# Patient Record
Sex: FEMALE | Race: WHITE | Employment: FULL TIME | URBAN - METROPOLITAN AREA
[De-identification: names, ages, dates, MRNs, and addresses within clinical notes are randomized per-mention and may not be internally consistent; named-entity substitution may affect disease eponyms.]

---

## 2020-09-22 ENCOUNTER — OFFICE VISIT (OUTPATIENT)
Dept: FAMILY MEDICINE CLINIC | Facility: CLINIC | Age: 35
End: 2020-09-22

## 2020-09-22 VITALS
TEMPERATURE: 99 F | WEIGHT: 145 LBS | SYSTOLIC BLOOD PRESSURE: 107 MMHG | HEART RATE: 79 BPM | HEIGHT: 61.5 IN | BODY MASS INDEX: 27.03 KG/M2 | DIASTOLIC BLOOD PRESSURE: 70 MMHG

## 2020-09-22 DIAGNOSIS — Z01.419 ROUTINE GYNECOLOGICAL EXAMINATION: Primary | ICD-10-CM

## 2020-09-22 PROCEDURE — 3008F BODY MASS INDEX DOCD: CPT | Performed by: FAMILY MEDICINE

## 2020-09-22 PROCEDURE — 3078F DIAST BP <80 MM HG: CPT | Performed by: FAMILY MEDICINE

## 2020-09-22 PROCEDURE — 90471 IMMUNIZATION ADMIN: CPT | Performed by: FAMILY MEDICINE

## 2020-09-22 PROCEDURE — 99385 PREV VISIT NEW AGE 18-39: CPT | Performed by: FAMILY MEDICINE

## 2020-09-22 PROCEDURE — 3074F SYST BP LT 130 MM HG: CPT | Performed by: FAMILY MEDICINE

## 2020-09-22 PROCEDURE — 90686 IIV4 VACC NO PRSV 0.5 ML IM: CPT | Performed by: FAMILY MEDICINE

## 2020-09-22 NOTE — PROGRESS NOTES
HPI:   Aimee Mendoza is a 27year old female who presents for a complete physical exam.   Patient's last menstrual period was 08/27/2020. 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  Had LEEP done last year with doc through dept developed, well nourished,in no apparent distress  SKIN: no rashes,no suspicious lesions  HEENT: atraumatic, normocephalic,ears and throat are clear  EYES:PERRLA, EOMI,,conjunctiva are clear  NECK: supple,no adenopathy,no bruits  CHEST: no chest tenderness

## 2020-09-23 LAB — HPV I/H RISK 1 DNA SPEC QL NAA+PROBE: NEGATIVE

## 2020-09-26 ENCOUNTER — LAB ENCOUNTER (OUTPATIENT)
Dept: LAB | Age: 35
End: 2020-09-26
Attending: FAMILY MEDICINE
Payer: COMMERCIAL

## 2020-09-26 DIAGNOSIS — Z01.419 ROUTINE GYNECOLOGICAL EXAMINATION: ICD-10-CM

## 2020-09-26 LAB
ALBUMIN SERPL-MCNC: 3.9 G/DL (ref 3.4–5)
ALBUMIN/GLOB SERPL: 1 {RATIO} (ref 1–2)
ALP LIVER SERPL-CCNC: 68 U/L
ALT SERPL-CCNC: 25 U/L
ANION GAP SERPL CALC-SCNC: 7 MMOL/L (ref 0–18)
AST SERPL-CCNC: 15 U/L (ref 15–37)
BASOPHILS # BLD AUTO: 0.03 X10(3) UL (ref 0–0.2)
BASOPHILS NFR BLD AUTO: 0.4 %
BILIRUB SERPL-MCNC: 0.5 MG/DL (ref 0.1–2)
BUN BLD-MCNC: 7 MG/DL (ref 7–18)
BUN/CREAT SERPL: 9.6 (ref 10–20)
CALCIUM BLD-MCNC: 8.9 MG/DL (ref 8.5–10.1)
CHLORIDE SERPL-SCNC: 109 MMOL/L (ref 98–112)
CHOLEST SMN-MCNC: 168 MG/DL (ref ?–200)
CO2 SERPL-SCNC: 22 MMOL/L (ref 21–32)
CREAT BLD-MCNC: 0.73 MG/DL
DEPRECATED RDW RBC AUTO: 43.4 FL (ref 35.1–46.3)
EOSINOPHIL # BLD AUTO: 0.15 X10(3) UL (ref 0–0.7)
EOSINOPHIL NFR BLD AUTO: 2 %
ERYTHROCYTE [DISTWIDTH] IN BLOOD BY AUTOMATED COUNT: 12.2 % (ref 11–15)
GLOBULIN PLAS-MCNC: 3.8 G/DL (ref 2.8–4.4)
GLUCOSE BLD-MCNC: 92 MG/DL (ref 70–99)
HCT VFR BLD AUTO: 38.3 %
HDLC SERPL-MCNC: 57 MG/DL (ref 40–59)
HGB BLD-MCNC: 13 G/DL
IMM GRANULOCYTES # BLD AUTO: 0.03 X10(3) UL (ref 0–1)
IMM GRANULOCYTES NFR BLD: 0.4 %
LDLC SERPL CALC-MCNC: 92 MG/DL (ref ?–100)
LYMPHOCYTES # BLD AUTO: 2.52 X10(3) UL (ref 1–4)
LYMPHOCYTES NFR BLD AUTO: 33.9 %
M PROTEIN MFR SERPL ELPH: 7.7 G/DL (ref 6.4–8.2)
MCH RBC QN AUTO: 32.6 PG (ref 26–34)
MCHC RBC AUTO-ENTMCNC: 33.9 G/DL (ref 31–37)
MCV RBC AUTO: 96 FL
MONOCYTES # BLD AUTO: 0.37 X10(3) UL (ref 0.1–1)
MONOCYTES NFR BLD AUTO: 5 %
NEUTROPHILS # BLD AUTO: 4.33 X10 (3) UL (ref 1.5–7.7)
NEUTROPHILS # BLD AUTO: 4.33 X10(3) UL (ref 1.5–7.7)
NEUTROPHILS NFR BLD AUTO: 58.3 %
NONHDLC SERPL-MCNC: 111 MG/DL (ref ?–130)
OSMOLALITY SERPL CALC.SUM OF ELEC: 284 MOSM/KG (ref 275–295)
PATIENT FASTING Y/N/NP: YES
PATIENT FASTING Y/N/NP: YES
PLATELET # BLD AUTO: 298 10(3)UL (ref 150–450)
POTASSIUM SERPL-SCNC: 3.7 MMOL/L (ref 3.5–5.1)
RBC # BLD AUTO: 3.99 X10(6)UL
SODIUM SERPL-SCNC: 138 MMOL/L (ref 136–145)
TRIGL SERPL-MCNC: 97 MG/DL (ref 30–149)
TSI SER-ACNC: 1.76 MIU/ML (ref 0.36–3.74)
VIT B12 SERPL-MCNC: >2000 PG/ML (ref 193–986)
VLDLC SERPL CALC-MCNC: 19 MG/DL (ref 0–30)
WBC # BLD AUTO: 7.4 X10(3) UL (ref 4–11)

## 2020-09-26 PROCEDURE — 82607 VITAMIN B-12: CPT

## 2020-09-26 PROCEDURE — 80061 LIPID PANEL: CPT

## 2020-09-26 PROCEDURE — 82306 VITAMIN D 25 HYDROXY: CPT

## 2020-09-26 PROCEDURE — 36415 COLL VENOUS BLD VENIPUNCTURE: CPT

## 2020-09-26 PROCEDURE — 80053 COMPREHEN METABOLIC PANEL: CPT

## 2020-09-26 PROCEDURE — 85025 COMPLETE CBC W/AUTO DIFF WBC: CPT

## 2020-09-26 PROCEDURE — 84443 ASSAY THYROID STIM HORMONE: CPT

## 2020-09-28 LAB — 25(OH)D3 SERPL-MCNC: 14.8 NG/ML (ref 30–100)

## 2020-09-29 NOTE — PROGRESS NOTES
Saeid Day - Your pap was normal and negative for HPV.  Plan to repeat pap in 5 years. - Dr. Irineo Najjar

## 2020-09-30 RX ORDER — CHOLECALCIFEROL (VITAMIN D3) 1250 MCG
1 CAPSULE ORAL WEEKLY
Qty: 12 CAPSULE | Refills: 4 | Status: SHIPPED | OUTPATIENT
Start: 2020-09-30 | End: 2021-08-20

## 2020-09-30 NOTE — PROGRESS NOTES
Anne-Marie - Your vitamin D levels are very low. Please start weekly vitamin D 50,000 - sent to the pharmacy. Your B12 levels are actually quite high so if you are taking supplements, please cut back.  Rest of the labs were normal. - Dr. John Bass

## 2020-10-10 ENCOUNTER — OFFICE VISIT (OUTPATIENT)
Dept: FAMILY MEDICINE CLINIC | Facility: CLINIC | Age: 35
End: 2020-10-10

## 2020-10-10 VITALS
TEMPERATURE: 98 F | DIASTOLIC BLOOD PRESSURE: 69 MMHG | SYSTOLIC BLOOD PRESSURE: 107 MMHG | BODY MASS INDEX: 27.14 KG/M2 | HEART RATE: 74 BPM | HEIGHT: 61.5 IN | WEIGHT: 145.63 LBS

## 2020-10-10 DIAGNOSIS — N92.6 MISSED MENSES: Primary | ICD-10-CM

## 2020-10-10 DIAGNOSIS — Z34.90 PREGNANCY, UNSPECIFIED GESTATIONAL AGE: ICD-10-CM

## 2020-10-10 PROCEDURE — 3008F BODY MASS INDEX DOCD: CPT | Performed by: NURSE PRACTITIONER

## 2020-10-10 PROCEDURE — 3078F DIAST BP <80 MM HG: CPT | Performed by: NURSE PRACTITIONER

## 2020-10-10 PROCEDURE — 3074F SYST BP LT 130 MM HG: CPT | Performed by: NURSE PRACTITIONER

## 2020-10-10 PROCEDURE — 81025 URINE PREGNANCY TEST: CPT | Performed by: NURSE PRACTITIONER

## 2020-10-10 PROCEDURE — 99213 OFFICE O/P EST LOW 20 MIN: CPT | Performed by: NURSE PRACTITIONER

## 2020-10-10 NOTE — PROGRESS NOTES
HPI    Patient presents for missed menses. LMP 8/27/2020. Has taken pregnancy tests at home that were positive. With nausea. Needs a letter to confirm pregnancy for work and would like a letter confirming pregnancy in order to apply for medicaid. week: Not on file        Minutes per session: Not on file      Stress: Not on file    Relationships      Social connections        Talks on phone: Not on file        Gets together: Not on file        Attends Qu[employer] service: Not on file        Active mem normal. Judgment and thought content normal.       Assessment and Plan:  Problem List Items Addressed This Visit     None      Visit Diagnoses     Missed menses    -  Primary    Relevant Orders    URINE PREGNANCY TEST (Completed)    Pregnancy, unspecified

## 2020-10-10 NOTE — PATIENT INSTRUCTIONS
New Pregnancy:    Prenatal vitamins-1 tablet once per day (may take at night if nausea/vomitting are a problem)    Avoid alcohol, smoking, second/ smoke, drugs    Avoid raw meats, undercooked meats, raw seafood, unpasteurized dairy products, soft

## 2020-11-09 ENCOUNTER — OFFICE VISIT (OUTPATIENT)
Dept: OBGYN CLINIC | Facility: CLINIC | Age: 35
End: 2020-11-09
Payer: MEDICAID

## 2020-11-09 VITALS — WEIGHT: 145.63 LBS | DIASTOLIC BLOOD PRESSURE: 72 MMHG | BODY MASS INDEX: 27 KG/M2 | SYSTOLIC BLOOD PRESSURE: 112 MMHG

## 2020-11-09 DIAGNOSIS — N92.6 MISSED MENSES: Primary | ICD-10-CM

## 2020-11-09 PROCEDURE — 3078F DIAST BP <80 MM HG: CPT | Performed by: OBSTETRICS & GYNECOLOGY

## 2020-11-09 PROCEDURE — 3074F SYST BP LT 130 MM HG: CPT | Performed by: OBSTETRICS & GYNECOLOGY

## 2020-11-09 PROCEDURE — 81025 URINE PREGNANCY TEST: CPT | Performed by: OBSTETRICS & GYNECOLOGY

## 2020-11-09 PROCEDURE — 99203 OFFICE O/P NEW LOW 30 MIN: CPT | Performed by: OBSTETRICS & GYNECOLOGY

## 2020-11-09 RX ORDER — VITAMIN A ACETATE, BETA CAROTENE, ASCORBIC ACID, CHOLECALCIFEROL, .ALPHA.-TOCOPHEROL ACETATE, DL-, THIAMINE MONONITRATE, RIBOFLAVIN, NIACINAMIDE, PYRIDOXINE HYDROCHLORIDE, FOLIC ACID, CYANOCOBALAMIN, CALCIUM CARBONATE, FERROUS FUMARATE, ZINC OXIDE, CUPRIC OXIDE 3080; 12; 120; 400; 1; 1.84; 3; 20; 22; 920; 25; 200; 27; 10; 2 [IU]/1; UG/1; MG/1; [IU]/1; MG/1; MG/1; MG/1; MG/1; MG/1; [IU]/1; MG/1; MG/1; MG/1; MG/1; MG/1
1 TABLET, FILM COATED ORAL DAILY
Qty: 90 TABLET | Refills: 3 | Status: SHIPPED | OUTPATIENT
Start: 2020-11-09 | End: 2020-12-09

## 2020-11-09 NOTE — PROGRESS NOTES
HPI:    Patient ID: Layla Wong is a 27year old female. HPI  Missed menses  Referred- [facility] APRN  23 P8 , LMP 8/27, 10 4/7 wks, [facility] 6/3/21. Taking PN vitamins. Denies cramping or vaginal bleeding. Has two kids ages 16 and 9.   Both uncom

## 2020-11-13 ENCOUNTER — OFFICE VISIT (OUTPATIENT)
Dept: OBGYN CLINIC | Facility: CLINIC | Age: 35
End: 2020-11-13
Payer: MEDICAID

## 2020-11-13 VITALS
BODY MASS INDEX: 27.03 KG/M2 | HEIGHT: 61.5 IN | SYSTOLIC BLOOD PRESSURE: 102 MMHG | DIASTOLIC BLOOD PRESSURE: 64 MMHG | WEIGHT: 145 LBS

## 2020-11-13 DIAGNOSIS — N92.6 MISSED MENSES: Primary | ICD-10-CM

## 2020-11-13 PROCEDURE — 3078F DIAST BP <80 MM HG: CPT | Performed by: OBSTETRICS & GYNECOLOGY

## 2020-11-13 PROCEDURE — 99212 OFFICE O/P EST SF 10 MIN: CPT | Performed by: OBSTETRICS & GYNECOLOGY

## 2020-11-13 PROCEDURE — 3074F SYST BP LT 130 MM HG: CPT | Performed by: OBSTETRICS & GYNECOLOGY

## 2020-11-13 PROCEDURE — 76817 TRANSVAGINAL US OBSTETRIC: CPT | Performed by: OBSTETRICS & GYNECOLOGY

## 2020-11-13 PROCEDURE — 3008F BODY MASS INDEX DOCD: CPT | Performed by: OBSTETRICS & GYNECOLOGY

## 2020-11-13 NOTE — PROGRESS NOTES
HPI:    Patient ID: Ariane Johnson is a 27year old female. HPI  Here for early OB ultrasound  No complaints. Transvaginal ultrasound shows single live intrauterine gestation at 11-3/7 weeks gestational age with normal fetal heart rate. Very active.   Reuben Severino

## 2020-12-14 ENCOUNTER — OFFICE VISIT (OUTPATIENT)
Dept: OBGYN CLINIC | Facility: CLINIC | Age: 35
End: 2020-12-14
Payer: MEDICAID

## 2020-12-14 VITALS — WEIGHT: 150.81 LBS | BODY MASS INDEX: 28 KG/M2

## 2020-12-14 DIAGNOSIS — N92.6 MISSED MENSES: Primary | ICD-10-CM

## 2020-12-14 DIAGNOSIS — Z34.82 ENCOUNTER FOR SUPERVISION OF OTHER NORMAL PREGNANCY IN SECOND TRIMESTER: ICD-10-CM

## 2020-12-14 PROCEDURE — 99213 OFFICE O/P EST LOW 20 MIN: CPT | Performed by: OBSTETRICS & GYNECOLOGY

## 2020-12-14 NOTE — PROGRESS NOTES
HPI:    Patient ID: Emanuel Lyn is a 27year old female. HPI  OB follow up  Did not do nurse ob ed visit. Patient 15 weeks pregnant. Denies vaginal bleeding or uterine cramping. Not nauseous any more.   Requests genetic screening with Quad screen, o

## 2020-12-21 ENCOUNTER — LAB ENCOUNTER (OUTPATIENT)
Dept: LAB | Age: 35
End: 2020-12-21
Attending: OBSTETRICS & GYNECOLOGY
Payer: COMMERCIAL

## 2020-12-21 DIAGNOSIS — Z34.82 ENCOUNTER FOR SUPERVISION OF OTHER NORMAL PREGNANCY IN SECOND TRIMESTER: ICD-10-CM

## 2020-12-21 PROCEDURE — 81511 FTL CGEN ABNOR FOUR ANAL: CPT

## 2020-12-21 PROCEDURE — 36415 COLL VENOUS BLD VENIPUNCTURE: CPT

## 2020-12-22 ENCOUNTER — NURSE ONLY (OUTPATIENT)
Dept: OBGYN CLINIC | Facility: CLINIC | Age: 35
End: 2020-12-22
Payer: MEDICAID

## 2020-12-22 DIAGNOSIS — Z34.82 ENCOUNTER FOR SUPERVISION OF OTHER NORMAL PREGNANCY IN SECOND TRIMESTER: Primary | ICD-10-CM

## 2020-12-22 RX ORDER — PRENATAL VIT/IRON FUM/FOLIC AC 27MG-0.8MG
1 TABLET ORAL DAILY
COMMUNITY

## 2020-12-22 NOTE — PROGRESS NOTES
Had over the phone Rn OB Ed. Missed menses apt with:MARIMAR  LMP: 20    Pre  BMI: 26.6  EPDS score:0     +UPT in office: 10/10/20, 20    US:   Working PORTIA:20  Hx of genetic abnormality in family: None  Hx of varicella:  Yes

## 2021-01-02 ENCOUNTER — LAB ENCOUNTER (OUTPATIENT)
Dept: LAB | Age: 36
End: 2021-01-02
Attending: OBSTETRICS & GYNECOLOGY
Payer: COMMERCIAL

## 2021-01-02 DIAGNOSIS — Z34.82 ENCOUNTER FOR SUPERVISION OF OTHER NORMAL PREGNANCY IN SECOND TRIMESTER: ICD-10-CM

## 2021-01-02 LAB
ANTIBODY SCREEN: NEGATIVE
BASOPHILS # BLD AUTO: 0.02 X10(3) UL (ref 0–0.2)
BASOPHILS NFR BLD AUTO: 0.2 %
DEPRECATED RDW RBC AUTO: 43.2 FL (ref 35.1–46.3)
EOSINOPHIL # BLD AUTO: 0.1 X10(3) UL (ref 0–0.7)
EOSINOPHIL NFR BLD AUTO: 1.2 %
ERYTHROCYTE [DISTWIDTH] IN BLOOD BY AUTOMATED COUNT: 12.4 % (ref 11–15)
HCT VFR BLD AUTO: 35.2 %
HGB BLD-MCNC: 11.6 G/DL
IMM GRANULOCYTES # BLD AUTO: 0.06 X10(3) UL (ref 0–1)
IMM GRANULOCYTES NFR BLD: 0.7 %
LYMPHOCYTES # BLD AUTO: 2.08 X10(3) UL (ref 1–4)
LYMPHOCYTES NFR BLD AUTO: 24.8 %
MCH RBC QN AUTO: 31.6 PG (ref 26–34)
MCHC RBC AUTO-ENTMCNC: 33 G/DL (ref 31–37)
MCV RBC AUTO: 95.9 FL
MONOCYTES # BLD AUTO: 0.37 X10(3) UL (ref 0.1–1)
MONOCYTES NFR BLD AUTO: 4.4 %
NEUTROPHILS # BLD AUTO: 5.76 X10 (3) UL (ref 1.5–7.7)
NEUTROPHILS # BLD AUTO: 5.76 X10(3) UL (ref 1.5–7.7)
NEUTROPHILS NFR BLD AUTO: 68.7 %
PLATELET # BLD AUTO: 241 10(3)UL (ref 150–450)
RBC # BLD AUTO: 3.67 X10(6)UL
RH BLOOD TYPE: POSITIVE
WBC # BLD AUTO: 8.4 X10(3) UL (ref 4–11)

## 2021-01-02 PROCEDURE — 85025 COMPLETE CBC W/AUTO DIFF WBC: CPT

## 2021-01-02 PROCEDURE — 87340 HEPATITIS B SURFACE AG IA: CPT

## 2021-01-02 PROCEDURE — 87389 HIV-1 AG W/HIV-1&-2 AB AG IA: CPT

## 2021-01-02 PROCEDURE — 86900 BLOOD TYPING SEROLOGIC ABO: CPT

## 2021-01-02 PROCEDURE — 86780 TREPONEMA PALLIDUM: CPT

## 2021-01-02 PROCEDURE — 87086 URINE CULTURE/COLONY COUNT: CPT

## 2021-01-02 PROCEDURE — 86762 RUBELLA ANTIBODY: CPT

## 2021-01-02 PROCEDURE — 36415 COLL VENOUS BLD VENIPUNCTURE: CPT

## 2021-01-02 PROCEDURE — 86901 BLOOD TYPING SEROLOGIC RH(D): CPT

## 2021-01-02 PROCEDURE — 86850 RBC ANTIBODY SCREEN: CPT

## 2021-01-03 LAB
HBV SURFACE AG SER-ACNC: <0.1 [IU]/L
HBV SURFACE AG SERPL QL IA: NONREACTIVE
RUBV IGG SER QL: POSITIVE
RUBV IGG SER-ACNC: 226.8 IU/ML (ref 10–?)

## 2021-01-04 LAB — T PALLIDUM AB SER QL: NEGATIVE

## 2021-01-15 ENCOUNTER — INITIAL PRENATAL (OUTPATIENT)
Dept: OBGYN CLINIC | Facility: CLINIC | Age: 36
End: 2021-01-15
Payer: MEDICAID

## 2021-01-15 VITALS
BODY MASS INDEX: 29 KG/M2 | HEART RATE: 87 BPM | SYSTOLIC BLOOD PRESSURE: 102 MMHG | WEIGHT: 154.38 LBS | DIASTOLIC BLOOD PRESSURE: 66 MMHG

## 2021-01-15 DIAGNOSIS — Z34.82 ENCOUNTER FOR SUPERVISION OF OTHER NORMAL PREGNANCY IN SECOND TRIMESTER: Primary | ICD-10-CM

## 2021-01-15 PROBLEM — Z34.90 SUPERVISION OF NORMAL PREGNANCY: Status: ACTIVE | Noted: 2021-01-15

## 2021-01-15 LAB
APPEARANCE: CLEAR
MULTISTIX LOT#: 1044 NUMERIC
PH, URINE: 7.5 (ref 4.5–8)
SPECIFIC GRAVITY: 1 (ref 1–1.03)
URINE-COLOR: YELLOW
UROBILINOGEN,SEMI-QN: 0.2 MG/DL (ref 0–1.9)

## 2021-01-15 PROCEDURE — 0502F SUBSEQUENT PRENATAL CARE: CPT | Performed by: OBSTETRICS & GYNECOLOGY

## 2021-01-15 PROCEDURE — 3074F SYST BP LT 130 MM HG: CPT | Performed by: OBSTETRICS & GYNECOLOGY

## 2021-01-15 PROCEDURE — 3078F DIAST BP <80 MM HG: CPT | Performed by: OBSTETRICS & GYNECOLOGY

## 2021-01-15 PROCEDURE — 81002 URINALYSIS NONAUTO W/O SCOPE: CPT | Performed by: OBSTETRICS & GYNECOLOGY

## 2021-01-15 NOTE — PROGRESS NOTES
No complaints. Good fetal movements. Denies vaginal pressure pain or discharge. Order for 20-week ultrasound and will be scheduling soon. Had a normal quad screen.

## 2021-01-26 ENCOUNTER — HOSPITAL ENCOUNTER (OUTPATIENT)
Dept: ULTRASOUND IMAGING | Age: 36
Discharge: HOME | End: 2021-01-26
Attending: OBSTETRICS & GYNECOLOGY
Payer: COMMERCIAL

## 2021-01-26 DIAGNOSIS — Z34.82 ENCOUNTER FOR SUPERVISION OF OTHER NORMAL PREGNANCY IN SECOND TRIMESTER: ICD-10-CM

## 2021-01-26 NOTE — PROGRESS NOTES
Ultrasound order changed. Patient needs level 2 ultrasound and cervical length. Needs to be done through MFM and not radiology.

## 2021-02-02 ENCOUNTER — TELEPHONE (OUTPATIENT)
Dept: OBGYN CLINIC | Facility: CLINIC | Age: 36
End: 2021-02-02

## 2021-02-02 ENCOUNTER — TELEPHONE (OUTPATIENT)
Dept: PERINATAL CARE | Facility: HOSPITAL | Age: 36
End: 2021-02-02

## 2021-02-02 NOTE — TELEPHONE ENCOUNTER
Patient name and  verified. Informed patient that she needs level 2 ultrasound and cervical length. Patient unable to have ultrasound done through radiology as they cannot do level 2 ultrasounds.  MFM asking why patient needs cervical length since it is

## 2021-02-02 NOTE — TELEPHONE ENCOUNTER
SHELLIM RECEIVED CALL FROM RADIOLOGY PT SCHEDULED THERE WHEN ORDER WAS FOR MFM. OB OFFICE INFORMED TO CONTACT PATIENT AND STRAIGHTEN OUT WHERE SHE SHOULD BE GOING. PT Nepalese SPEAKING.

## 2021-02-03 ENCOUNTER — TELEPHONE (OUTPATIENT)
Dept: OBGYN CLINIC | Facility: CLINIC | Age: 36
End: 2021-02-03

## 2021-02-03 NOTE — TELEPHONE ENCOUNTER
Copied message from [address]   Patient was recommended to do level 2 ultrasound since her first visit December 14.  As she is now 22-1/2 weeks and has not done anything but a first trimester ultrasound, [facility]

## 2021-02-03 NOTE — TELEPHONE ENCOUNTER
Informed of AJb advise. Order in system. Number given for pt to schedule Level I through radiology. Pt has apt for Level II with [facility] on 02/22. All questions answered.

## 2021-02-03 NOTE — TELEPHONE ENCOUNTER
Per pt she tried calling central scheduling today and wasn't able to set up an apt for 2nd 3rd trimester ultrasound. Called scheduling dept and scheduled pt on 2/9 at 3:00 in [address] [place] [address] 402. To drink 32 ounce an hour prior.  verbalized understanding no further qu

## 2021-02-09 ENCOUNTER — HOSPITAL ENCOUNTER (OUTPATIENT)
Dept: ULTRASOUND IMAGING | Age: 36
Discharge: HOME | End: 2021-02-09
Attending: OBSTETRICS & GYNECOLOGY
Payer: COMMERCIAL

## 2021-02-09 PROCEDURE — 76805 OB US >/= 14 WKS SNGL FETUS: CPT | Performed by: OBSTETRICS & GYNECOLOGY

## 2021-02-10 ENCOUNTER — OFFICE VISIT (OUTPATIENT)
Dept: FAMILY MEDICINE CLINIC | Facility: CLINIC | Age: 36
End: 2021-02-10

## 2021-02-10 VITALS
DIASTOLIC BLOOD PRESSURE: 68 MMHG | BODY MASS INDEX: 29.45 KG/M2 | HEIGHT: 61.5 IN | SYSTOLIC BLOOD PRESSURE: 110 MMHG | WEIGHT: 158 LBS | HEART RATE: 80 BPM

## 2021-02-10 DIAGNOSIS — H61.21 EXCESSIVE CERUMEN IN EAR CANAL, RIGHT: Primary | ICD-10-CM

## 2021-02-10 PROCEDURE — 3078F DIAST BP <80 MM HG: CPT | Performed by: NURSE PRACTITIONER

## 2021-02-10 PROCEDURE — 99213 OFFICE O/P EST LOW 20 MIN: CPT | Performed by: NURSE PRACTITIONER

## 2021-02-10 PROCEDURE — 3008F BODY MASS INDEX DOCD: CPT | Performed by: NURSE PRACTITIONER

## 2021-02-10 PROCEDURE — 3074F SYST BP LT 130 MM HG: CPT | Performed by: NURSE PRACTITIONER

## 2021-02-10 NOTE — PROGRESS NOTES
HPI  Pt here for right ear discomfort and feeling clogged for the past 3 weeks. Has some itching to ear. Pt is pregnant is 23 weeks pregnant    Review of Systems   Constitutional: Negative for activity change and appetite change.    HENT: Positive for ear file      Stress: Not on file    Relationships      Social connections        Talks on phone: Not on file        Gets together: Not on file        Attends [employer] service: Not on file        Active member of club or organization: Not on file        Atten alert and oriented to person, place, and time.        Assessment and Plan:  Problem List Items Addressed This Visit        ENT    Excessive cerumen in ear canal, right - Primary     Discussed use of debrox 5 gtts at hs  Do not use qtips in ears  Please call

## 2021-02-10 NOTE — ASSESSMENT & PLAN NOTE
Discussed use of debrox 5 gtts at hs  Do not use qtips in ears  Please call if symptoms worsen or are not resolving.

## 2021-02-10 NOTE — PATIENT INSTRUCTIONS
Retención de cera en los oídos     Inner ear structures including ear canal and eardrum. La retención de cera en los oídos se produce por la acumulación de la cera natural del oído, lo cual es muy común.  [place] puede causar síntomas onur la pérdida de la Introducir objetos repetidas veces en el oído también puede provocar la retención de cera en el oído. Por ejemplo, usar hisopos de algodón puede empujar el cerumen más adentro en el oído. Con el tiempo, esto puede causar un bloqueo.  Los [employer], los tapo Los proveedores de atención médica desaconsejan el uso de las valery y de los equipos de limpieza para los oídos. No está probado que estos métodos funcionen y pueden causar problemas.    Prevención de la retención de cerumen  Es posible que no pueda preveni

## 2021-02-11 ENCOUNTER — TELEPHONE (OUTPATIENT)
Dept: OBGYN CLINIC | Facility: CLINIC | Age: 36
End: 2021-02-11

## 2021-02-11 NOTE — TELEPHONE ENCOUNTER
[employer] message sent to pt.      ----- Message from Ro Yen MD sent at 2/11/2021  3:27 PM CST -----  Please inform patient by [address], mail, or call of normal OB ultrasound.

## 2021-02-12 ENCOUNTER — ROUTINE PRENATAL (OUTPATIENT)
Dept: OBGYN CLINIC | Facility: CLINIC | Age: 36
End: 2021-02-12
Payer: MEDICAID

## 2021-02-12 VITALS
WEIGHT: 161 LBS | SYSTOLIC BLOOD PRESSURE: 103 MMHG | BODY MASS INDEX: 30 KG/M2 | HEART RATE: 87 BPM | DIASTOLIC BLOOD PRESSURE: 66 MMHG

## 2021-02-12 DIAGNOSIS — Z34.82 ENCOUNTER FOR SUPERVISION OF OTHER NORMAL PREGNANCY IN SECOND TRIMESTER: Primary | ICD-10-CM

## 2021-02-12 PROBLEM — N92.6 MISSED MENSES: Status: RESOLVED | Noted: 2020-11-09 | Resolved: 2021-02-12

## 2021-02-12 PROCEDURE — 81002 URINALYSIS NONAUTO W/O SCOPE: CPT | Performed by: OBSTETRICS & GYNECOLOGY

## 2021-02-12 PROCEDURE — 3074F SYST BP LT 130 MM HG: CPT | Performed by: OBSTETRICS & GYNECOLOGY

## 2021-02-12 PROCEDURE — 3078F DIAST BP <80 MM HG: CPT | Performed by: OBSTETRICS & GYNECOLOGY

## 2021-02-12 PROCEDURE — 0502F SUBSEQUENT PRENATAL CARE: CPT | Performed by: OBSTETRICS & GYNECOLOGY

## 2021-02-20 PROBLEM — O09.522 AMA (ADVANCED MATERNAL AGE) MULTIGRAVIDA 35+, SECOND TRIMESTER: Status: ACTIVE | Noted: 2021-02-20

## 2021-02-21 NOTE — PROGRESS NOTES
Outpatient Maternal-Fetal Medicine Consultation    Dear Dr. Brayden Ross    Thank you for requesting ultrasound evaluation and maternal fetal medicine consultation on your patient Pastor Henriquez.   As you are aware she is a 38year old female [id number] with alert and oriented,no acute distress  Abdomen: gravid, soft, non-tender  Extremities: non-tender, no edema    OBSTETRIC ULTRASOUND  The patient had a level II ultrasound today which revealed size consistent with dates and a normal detailed anatomic survey. also discussed the increased risk of chromosomal abnormalities associated with advanced maternal age. I reviewed that an ultrasound examination cannot reliably exclude potential genetic abnormalities.  Given that the patient will be 38years old at the rosalba summarized above.

## 2021-02-22 ENCOUNTER — HOSPITAL ENCOUNTER (OUTPATIENT)
Dept: PERINATAL CARE | Facility: HOSPITAL | Age: 36
Discharge: HOME | End: 2021-02-22
Attending: OBSTETRICS & GYNECOLOGY
Payer: COMMERCIAL

## 2021-02-22 VITALS
DIASTOLIC BLOOD PRESSURE: 59 MMHG | BODY MASS INDEX: 30 KG/M2 | HEART RATE: 91 BPM | SYSTOLIC BLOOD PRESSURE: 106 MMHG | WEIGHT: 161 LBS

## 2021-02-22 DIAGNOSIS — O09.522 AMA (ADVANCED MATERNAL AGE) MULTIGRAVIDA 35+, SECOND TRIMESTER: ICD-10-CM

## 2021-02-22 DIAGNOSIS — O09.522 AMA (ADVANCED MATERNAL AGE) MULTIGRAVIDA 35+, SECOND TRIMESTER: Primary | ICD-10-CM

## 2021-02-22 PROCEDURE — 99243 OFF/OP CNSLTJ NEW/EST LOW 30: CPT | Performed by: OBSTETRICS & GYNECOLOGY

## 2021-02-22 PROCEDURE — 76811 OB US DETAILED SNGL FETUS: CPT | Performed by: OBSTETRICS & GYNECOLOGY

## 2021-03-06 ENCOUNTER — LAB ENCOUNTER (OUTPATIENT)
Dept: LAB | Age: 36
End: 2021-03-06
Attending: OBSTETRICS & GYNECOLOGY
Payer: COMMERCIAL

## 2021-03-06 DIAGNOSIS — Z34.82 ENCOUNTER FOR SUPERVISION OF OTHER NORMAL PREGNANCY IN SECOND TRIMESTER: ICD-10-CM

## 2021-03-06 LAB
BASOPHILS # BLD AUTO: 0.02 X10(3) UL (ref 0–0.2)
BASOPHILS NFR BLD AUTO: 0.3 %
DEPRECATED RDW RBC AUTO: 47 FL (ref 35.1–46.3)
EOSINOPHIL # BLD AUTO: 0.12 X10(3) UL (ref 0–0.7)
EOSINOPHIL NFR BLD AUTO: 1.5 %
ERYTHROCYTE [DISTWIDTH] IN BLOOD BY AUTOMATED COUNT: 12.9 % (ref 11–15)
GLUCOSE 1H P GLC SERPL-MCNC: 141 MG/DL
HCT VFR BLD AUTO: 34.8 %
HGB BLD-MCNC: 11.3 G/DL
IMM GRANULOCYTES # BLD AUTO: 0.13 X10(3) UL (ref 0–1)
IMM GRANULOCYTES NFR BLD: 1.7 %
LYMPHOCYTES # BLD AUTO: 1.58 X10(3) UL (ref 1–4)
LYMPHOCYTES NFR BLD AUTO: 20.2 %
MCH RBC QN AUTO: 32.3 PG (ref 26–34)
MCHC RBC AUTO-ENTMCNC: 32.5 G/DL (ref 31–37)
MCV RBC AUTO: 99.4 FL
MONOCYTES # BLD AUTO: 0.42 X10(3) UL (ref 0.1–1)
MONOCYTES NFR BLD AUTO: 5.4 %
NEUTROPHILS # BLD AUTO: 5.56 X10 (3) UL (ref 1.5–7.7)
NEUTROPHILS # BLD AUTO: 5.56 X10(3) UL (ref 1.5–7.7)
NEUTROPHILS NFR BLD AUTO: 70.9 %
PLATELET # BLD AUTO: 235 10(3)UL (ref 150–450)
RBC # BLD AUTO: 3.5 X10(6)UL
WBC # BLD AUTO: 7.8 X10(3) UL (ref 4–11)

## 2021-03-06 PROCEDURE — 82950 GLUCOSE TEST: CPT

## 2021-03-06 PROCEDURE — 36415 COLL VENOUS BLD VENIPUNCTURE: CPT

## 2021-03-06 PROCEDURE — 85025 COMPLETE CBC W/AUTO DIFF WBC: CPT

## 2021-03-08 ENCOUNTER — TELEPHONE (OUTPATIENT)
Dept: OBGYN CLINIC | Facility: CLINIC | Age: 36
End: 2021-03-08

## 2021-03-08 DIAGNOSIS — R73.09 ELEVATED GLUCOSE TOLERANCE TEST: Primary | ICD-10-CM

## 2021-03-08 NOTE — TELEPHONE ENCOUNTER
Armenian phone line  #[phone] used to translate phone call. LMTCB    ----- Message from Nataliya Michelle MD sent at 3/8/2021  9:07 AM CST -----  Please inform of elevated Glucola; needs 3 hour GTT.

## 2021-03-09 ENCOUNTER — ROUTINE PRENATAL (OUTPATIENT)
Dept: OBGYN CLINIC | Facility: CLINIC | Age: 36
End: 2021-03-09
Payer: MEDICAID

## 2021-03-09 VITALS — SYSTOLIC BLOOD PRESSURE: 106 MMHG | DIASTOLIC BLOOD PRESSURE: 69 MMHG | BODY MASS INDEX: 30 KG/M2 | WEIGHT: 164 LBS

## 2021-03-09 DIAGNOSIS — O09.529 ANTEPARTUM MULTIGRAVIDA OF ADVANCED MATERNAL AGE: Primary | ICD-10-CM

## 2021-03-09 DIAGNOSIS — Z34.92 NORMAL PREGNANCY IN SECOND TRIMESTER: ICD-10-CM

## 2021-03-09 LAB
APPEARANCE: CLEAR
MULTISTIX LOT#: 1044 NUMERIC
PH, URINE: 6 (ref 4.5–8)
SPECIFIC GRAVITY: 1.02 (ref 1–1.03)
URINE-COLOR: YELLOW
UROBILINOGEN,SEMI-QN: 0.2 MG/DL (ref 0–1.9)

## 2021-03-09 PROCEDURE — 3078F DIAST BP <80 MM HG: CPT | Performed by: OBSTETRICS & GYNECOLOGY

## 2021-03-09 PROCEDURE — 3074F SYST BP LT 130 MM HG: CPT | Performed by: OBSTETRICS & GYNECOLOGY

## 2021-03-09 PROCEDURE — 81002 URINALYSIS NONAUTO W/O SCOPE: CPT | Performed by: OBSTETRICS & GYNECOLOGY

## 2021-03-09 PROCEDURE — 0502F SUBSEQUENT PRENATAL CARE: CPT | Performed by: OBSTETRICS & GYNECOLOGY

## 2021-03-09 NOTE — PROGRESS NOTES
[facility] [place]  Obstetrics and Gynecology  Prenatal Visit  Ever Ordoñez MD    DONNY Medina is a 38year old.o. [id number] 27w5d weeks. Here for routine prenatal visit and is without complaints.   Patient denies any regular uterine contrac

## 2021-03-13 ENCOUNTER — LAB ENCOUNTER (OUTPATIENT)
Dept: LAB | Age: 36
End: 2021-03-13
Attending: OBSTETRICS & GYNECOLOGY
Payer: COMMERCIAL

## 2021-03-13 DIAGNOSIS — R73.09 ELEVATED GLUCOSE TOLERANCE TEST: ICD-10-CM

## 2021-03-13 LAB
1 HR GLUCOSE GESTATIONAL: 146 MG/DL
GLUCOSE 1H P GLC SERPL-MCNC: 117 MG/DL
GLUCOSE 3H P GLC SERPL-MCNC: 111 MG/DL
GLUCOSE P FAST SERPL-MCNC: 89 MG/DL

## 2021-03-13 PROCEDURE — 36415 COLL VENOUS BLD VENIPUNCTURE: CPT

## 2021-03-13 PROCEDURE — 82952 GTT-ADDED SAMPLES: CPT

## 2021-03-13 PROCEDURE — 82951 GLUCOSE TOLERANCE TEST (GTT): CPT

## 2021-03-16 ENCOUNTER — TELEPHONE (OUTPATIENT)
Dept: PERINATAL CARE | Facility: HOSPITAL | Age: 36
End: 2021-03-16

## 2021-03-22 ENCOUNTER — TELEPHONE (OUTPATIENT)
Dept: OBGYN CLINIC | Facility: CLINIC | Age: 36
End: 2021-03-22

## 2021-03-22 ENCOUNTER — ROUTINE PRENATAL (OUTPATIENT)
Dept: OBGYN CLINIC | Facility: CLINIC | Age: 36
End: 2021-03-22
Payer: MEDICAID

## 2021-03-22 VITALS — SYSTOLIC BLOOD PRESSURE: 108 MMHG | WEIGHT: 166 LBS | DIASTOLIC BLOOD PRESSURE: 62 MMHG | BODY MASS INDEX: 31 KG/M2

## 2021-03-22 DIAGNOSIS — Z34.83 ENCOUNTER FOR SUPERVISION OF OTHER NORMAL PREGNANCY IN THIRD TRIMESTER: Primary | ICD-10-CM

## 2021-03-22 DIAGNOSIS — O09.529 ANTEPARTUM MULTIGRAVIDA OF ADVANCED MATERNAL AGE: ICD-10-CM

## 2021-03-22 DIAGNOSIS — Z23 NEED FOR VACCINATION: ICD-10-CM

## 2021-03-22 PROBLEM — R87.613 PAPANICOLAOU SMEAR OF CERVIX WITH HIGH GRADE SQUAMOUS INTRAEPITHELIAL LESION (HGSIL): Status: ACTIVE | Noted: 2021-03-22

## 2021-03-22 LAB
APPEARANCE: CLEAR
MULTISTIX LOT#: 5077 NUMERIC
PH, URINE: 6 (ref 4.5–8)
SPECIFIC GRAVITY: 1.01 (ref 1–1.03)
URINE-COLOR: YELLOW
UROBILINOGEN,SEMI-QN: 0.2 MG/DL (ref 0–1.9)

## 2021-03-22 PROCEDURE — 3074F SYST BP LT 130 MM HG: CPT | Performed by: ADVANCED PRACTICE MIDWIFE

## 2021-03-22 PROCEDURE — 3078F DIAST BP <80 MM HG: CPT | Performed by: ADVANCED PRACTICE MIDWIFE

## 2021-03-22 PROCEDURE — 90715 TDAP VACCINE 7 YRS/> IM: CPT | Performed by: ADVANCED PRACTICE MIDWIFE

## 2021-03-22 PROCEDURE — 0502F SUBSEQUENT PRENATAL CARE: CPT | Performed by: ADVANCED PRACTICE MIDWIFE

## 2021-03-22 PROCEDURE — 81003 URINALYSIS AUTO W/O SCOPE: CPT | Performed by: ADVANCED PRACTICE MIDWIFE

## 2021-03-22 PROCEDURE — 90471 IMMUNIZATION ADMIN: CPT | Performed by: ADVANCED PRACTICE MIDWIFE

## 2021-03-22 RX ORDER — BREAST PUMP
EACH MISCELLANEOUS
Qty: 1 EACH | Refills: 0 | Status: SHIPPED | OUTPATIENT
Start: 2021-03-22 | End: 2021-03-22

## 2021-03-22 RX ORDER — BREAST PUMP
EACH MISCELLANEOUS
Qty: 1 EACH | Refills: 0 | Status: SHIPPED | OUTPATIENT
Start: 2021-03-22 | End: 2021-08-20

## 2021-03-22 NOTE — PROGRESS NOTES
[facility]  Obstetrics and Gynecology  Prenatal Visit  Latesha Myers CNM, APRN    HPI   Emanuel Miners Allen Porter is a 38year old.o. [id number] 29w4d weeks.   Baby active, its a girl  Denies HA, vision change, URQ pain, swelling, CTX, or LOF  Denies fev maternal age    Plan     TDAP vaccine counseled, offered, give today  Third trimester labs ordered completed normal abnormal, counseled 35 wk labs and GBS  Blood type O positive Rhogam not indicated  Reviewed [facility]  Third trimester warning signs reviewed  Tr

## 2021-03-22 NOTE — TELEPHONE ENCOUNTER
Per pt she had what she thinks was a LEEP procedure in 2019 and requested the records in January or February in the [address] [place] [address] location to be sent to Dr. Janel De La Paz.    No records seen yet   Pt provided the number to the [facility]  [phone], but o

## 2021-04-06 ENCOUNTER — ROUTINE PRENATAL (OUTPATIENT)
Dept: OBGYN CLINIC | Facility: CLINIC | Age: 36
End: 2021-04-06
Payer: MEDICAID

## 2021-04-06 VITALS — DIASTOLIC BLOOD PRESSURE: 70 MMHG | WEIGHT: 167 LBS | BODY MASS INDEX: 31 KG/M2 | SYSTOLIC BLOOD PRESSURE: 104 MMHG

## 2021-04-06 DIAGNOSIS — Z34.83 ENCOUNTER FOR SUPERVISION OF OTHER NORMAL PREGNANCY IN THIRD TRIMESTER: Primary | ICD-10-CM

## 2021-04-06 PROCEDURE — 3078F DIAST BP <80 MM HG: CPT | Performed by: OBSTETRICS & GYNECOLOGY

## 2021-04-06 PROCEDURE — 81002 URINALYSIS NONAUTO W/O SCOPE: CPT | Performed by: OBSTETRICS & GYNECOLOGY

## 2021-04-06 PROCEDURE — 0502F SUBSEQUENT PRENATAL CARE: CPT | Performed by: OBSTETRICS & GYNECOLOGY

## 2021-04-06 PROCEDURE — 3074F SYST BP LT 130 MM HG: CPT | Performed by: OBSTETRICS & GYNECOLOGY

## 2021-04-12 NOTE — TELEPHONE ENCOUNTER
Called clinic to request pt records, office closed. Will open tomorrow Tues 9-7, Mon. Wednesday, Fri 9-5, and Thursday 9-7.

## 2021-04-13 ENCOUNTER — HOSPITAL ENCOUNTER (OUTPATIENT)
Dept: PERINATAL CARE | Facility: HOSPITAL | Age: 36
Discharge: HOME | End: 2021-04-13
Attending: OBSTETRICS & GYNECOLOGY
Payer: COMMERCIAL

## 2021-04-13 VITALS
SYSTOLIC BLOOD PRESSURE: 96 MMHG | HEART RATE: 81 BPM | WEIGHT: 167 LBS | BODY MASS INDEX: 31 KG/M2 | DIASTOLIC BLOOD PRESSURE: 61 MMHG

## 2021-04-13 DIAGNOSIS — O09.529 ANTEPARTUM MULTIGRAVIDA OF ADVANCED MATERNAL AGE: ICD-10-CM

## 2021-04-13 DIAGNOSIS — O09.529 ANTEPARTUM MULTIGRAVIDA OF ADVANCED MATERNAL AGE: Primary | ICD-10-CM

## 2021-04-13 PROCEDURE — 76816 OB US FOLLOW-UP PER FETUS: CPT | Performed by: OBSTETRICS & GYNECOLOGY

## 2021-04-13 PROCEDURE — 99213 OFFICE O/P EST LOW 20 MIN: CPT | Performed by: OBSTETRICS & GYNECOLOGY

## 2021-04-13 PROCEDURE — 76819 FETAL BIOPHYS PROFIL W/O NST: CPT | Performed by: OBSTETRICS & GYNECOLOGY

## 2021-04-13 NOTE — PROGRESS NOTES
Outpatient Maternal-Fetal Medicine Consultation    Dear Dr. Radha Chen    Thank you for requesting ultrasound evaluation and maternal fetal medicine consultation on your patient Isidro Sandoval.   As you are aware she is a 38year old female [id number] with Disp: 12 capsule, Rfl: 4  Allergies: No Known Allergies    PHYSICAL EXAMINATION:  BP 96/61   Pulse 81   Wt 167 lb (75.8 kg)   LMP 08/27/2020 (Exact Date)   BMI 31.04 kg/m²   General: alert and oriented,no acute distress  Abdomen: gravid, soft, non-tender

## 2021-04-14 NOTE — TELEPHONE ENCOUNTER
Spoke with MA from clinic and requested records for LEEP procedure. Will be faxing them over to office. Look for fax and provider to provider once received.

## 2021-04-22 ENCOUNTER — ROUTINE PRENATAL (OUTPATIENT)
Dept: OBGYN CLINIC | Facility: CLINIC | Age: 36
End: 2021-04-22
Payer: MEDICAID

## 2021-04-22 VITALS — DIASTOLIC BLOOD PRESSURE: 64 MMHG | WEIGHT: 169 LBS | BODY MASS INDEX: 31 KG/M2 | SYSTOLIC BLOOD PRESSURE: 108 MMHG

## 2021-04-22 DIAGNOSIS — Z34.83 ENCOUNTER FOR SUPERVISION OF OTHER NORMAL PREGNANCY IN THIRD TRIMESTER: Primary | ICD-10-CM

## 2021-04-22 PROCEDURE — 81002 URINALYSIS NONAUTO W/O SCOPE: CPT | Performed by: OBSTETRICS & GYNECOLOGY

## 2021-04-22 PROCEDURE — 3078F DIAST BP <80 MM HG: CPT | Performed by: OBSTETRICS & GYNECOLOGY

## 2021-04-22 PROCEDURE — 0502F SUBSEQUENT PRENATAL CARE: CPT | Performed by: OBSTETRICS & GYNECOLOGY

## 2021-04-22 PROCEDURE — 3074F SYST BP LT 130 MM HG: CPT | Performed by: OBSTETRICS & GYNECOLOGY

## 2021-04-28 NOTE — TELEPHONE ENCOUNTER
Spoke with Barbara Sage from [facility] whom will fax over pt records. Look for records.

## 2021-05-06 PROBLEM — H61.21 EXCESSIVE CERUMEN IN EAR CANAL, RIGHT: Status: RESOLVED | Noted: 2021-02-10 | Resolved: 2021-05-06

## 2021-05-06 NOTE — TELEPHONE ENCOUNTER
Patient name and  verified. FMLA forms also received with fax. Confirmed with patient forms to be completed by ob office. Will send to forms dept for completion.  Please advise

## 2021-05-07 ENCOUNTER — TELEPHONE (OUTPATIENT)
Dept: OBGYN CLINIC | Facility: CLINIC | Age: 36
End: 2021-05-07

## 2021-05-07 ENCOUNTER — ROUTINE PRENATAL (OUTPATIENT)
Dept: OBGYN CLINIC | Facility: CLINIC | Age: 36
End: 2021-05-07
Payer: MEDICAID

## 2021-05-07 VITALS — WEIGHT: 170 LBS | BODY MASS INDEX: 32 KG/M2 | DIASTOLIC BLOOD PRESSURE: 70 MMHG | SYSTOLIC BLOOD PRESSURE: 110 MMHG

## 2021-05-07 DIAGNOSIS — Z34.83 ENCOUNTER FOR SUPERVISION OF OTHER NORMAL PREGNANCY IN THIRD TRIMESTER: Primary | ICD-10-CM

## 2021-05-07 PROCEDURE — 3074F SYST BP LT 130 MM HG: CPT | Performed by: OBSTETRICS & GYNECOLOGY

## 2021-05-07 PROCEDURE — 81002 URINALYSIS NONAUTO W/O SCOPE: CPT | Performed by: OBSTETRICS & GYNECOLOGY

## 2021-05-07 PROCEDURE — 3078F DIAST BP <80 MM HG: CPT | Performed by: OBSTETRICS & GYNECOLOGY

## 2021-05-07 PROCEDURE — 0502F SUBSEQUENT PRENATAL CARE: CPT | Performed by: OBSTETRICS & GYNECOLOGY

## 2021-05-08 ENCOUNTER — LAB ENCOUNTER (OUTPATIENT)
Dept: LAB | Age: 36
End: 2021-05-08
Attending: OBSTETRICS & GYNECOLOGY
Payer: COMMERCIAL

## 2021-05-08 DIAGNOSIS — Z34.83 ENCOUNTER FOR SUPERVISION OF OTHER NORMAL PREGNANCY IN THIRD TRIMESTER: ICD-10-CM

## 2021-05-08 PROCEDURE — 87389 HIV-1 AG W/HIV-1&-2 AB AG IA: CPT

## 2021-05-08 PROCEDURE — 36415 COLL VENOUS BLD VENIPUNCTURE: CPT

## 2021-05-08 PROCEDURE — 86780 TREPONEMA PALLIDUM: CPT

## 2021-05-08 PROCEDURE — 85025 COMPLETE CBC W/AUTO DIFF WBC: CPT

## 2021-05-10 ENCOUNTER — TELEPHONE (OUTPATIENT)
Dept: OBGYN CLINIC | Facility: CLINIC | Age: 36
End: 2021-05-10

## 2021-05-10 DIAGNOSIS — O09.523 MULTIGRAVIDA OF ADVANCED MATERNAL AGE IN THIRD TRIMESTER: Primary | ICD-10-CM

## 2021-05-10 NOTE — TELEPHONE ENCOUNTER
Left message for pt to call back to obtain details in regards to FMLA forms completion and inform that we are awaiting a response to the [employer] message in regards to Monse Varela.

## 2021-05-10 NOTE — TELEPHONE ENCOUNTER
Moroccan phone line  #[phone] used to translate phone call. ----- Message from Alexis Sorto MD sent at 5/9/2021  8:41 PM CDT -----    UNABLE TO [address] [place] [address]. NO VOICEMAIL SET UP.        Please inform patient that GBS vaginal culture was posi

## 2021-05-10 NOTE — TELEPHONE ENCOUNTER
Lmtcb: pt to schedule weekly NSTs due to [facility] per AJB's last office visit note. Pt can call [facility] to schedule: [phone].

## 2021-05-10 NOTE — TELEPHONE ENCOUNTER
Dr. Delcid Blinks     Please sign off on form: FMLA  -Highlight the patient and hit \"Chart\" button.   -In Chart Review, w/in the Encounter tab - click 1 time on the Telephone call encounter for 5/7/2021 Scroll down the telephone encounter.  -Click \"scan on\" devendra

## 2021-05-10 NOTE — TELEPHONE ENCOUNTER
Pt is requesting a call back from a nurse. Pt states at her last PN appt she was told she was going to get called in regards to NST appts.  Pt is Irish speaking no

## 2021-05-12 ENCOUNTER — ROUTINE PRENATAL (OUTPATIENT)
Dept: OBGYN CLINIC | Facility: CLINIC | Age: 36
End: 2021-05-12
Payer: MEDICAID

## 2021-05-12 ENCOUNTER — APPOINTMENT (OUTPATIENT)
Dept: OBGYN CLINIC | Facility: HOSPITAL | Age: 36
End: 2021-05-12
Payer: COMMERCIAL

## 2021-05-12 ENCOUNTER — HOSPITAL ENCOUNTER (OUTPATIENT)
Facility: HOSPITAL | Age: 36
Discharge: HOME | End: 2021-05-12
Attending: OBSTETRICS & GYNECOLOGY | Admitting: OBSTETRICS & GYNECOLOGY
Payer: COMMERCIAL

## 2021-05-12 VITALS — DIASTOLIC BLOOD PRESSURE: 63 MMHG | WEIGHT: 169 LBS | BODY MASS INDEX: 31 KG/M2 | SYSTOLIC BLOOD PRESSURE: 102 MMHG

## 2021-05-12 VITALS
HEART RATE: 79 BPM | SYSTOLIC BLOOD PRESSURE: 103 MMHG | DIASTOLIC BLOOD PRESSURE: 67 MMHG | TEMPERATURE: 98 F | RESPIRATION RATE: 16 BRPM

## 2021-05-12 DIAGNOSIS — Z34.83 ENCOUNTER FOR SUPERVISION OF OTHER NORMAL PREGNANCY IN THIRD TRIMESTER: Primary | ICD-10-CM

## 2021-05-12 PROCEDURE — 3078F DIAST BP <80 MM HG: CPT | Performed by: OBSTETRICS & GYNECOLOGY

## 2021-05-12 PROCEDURE — 0502F SUBSEQUENT PRENATAL CARE: CPT | Performed by: OBSTETRICS & GYNECOLOGY

## 2021-05-12 PROCEDURE — 81002 URINALYSIS NONAUTO W/O SCOPE: CPT | Performed by: OBSTETRICS & GYNECOLOGY

## 2021-05-12 PROCEDURE — 59025 FETAL NON-STRESS TEST: CPT | Performed by: OBSTETRICS & GYNECOLOGY

## 2021-05-12 PROCEDURE — 3074F SYST BP LT 130 MM HG: CPT | Performed by: OBSTETRICS & GYNECOLOGY

## 2021-05-12 NOTE — PROGRESS NOTES
Pt is a 38year old female admitted to TR1/TR1-A. Pt here for weekly NST for AMA  Pt is  36w6d intra-uterine pregnancy. History obtained, consents signed. Oriented to room, staff, and plan of care.

## 2021-05-12 NOTE — PROGRESS NOTES
No C/Os. Had NST today for AMA. For weekly NSTs due to [facility]. Patient informed of + GBBS and will need IV Penicillin while in Labor.

## 2021-05-17 NOTE — TELEPHONE ENCOUNTER
HIPAA logged and scanned into chart. Section 4 just has HR.  Will send [facility].

## 2021-05-17 NOTE — TELEPHONE ENCOUNTER
Left message for pt to call back to inform that the forms department is still awaiting the completion of HIPPA form.

## 2021-05-19 ENCOUNTER — APPOINTMENT (OUTPATIENT)
Dept: OBGYN CLINIC | Facility: HOSPITAL | Age: 36
End: 2021-05-19
Payer: COMMERCIAL

## 2021-05-19 ENCOUNTER — NST DOCUMENTATION (OUTPATIENT)
Dept: OBGYN CLINIC | Facility: CLINIC | Age: 36
End: 2021-05-19

## 2021-05-19 ENCOUNTER — HOSPITAL ENCOUNTER (OUTPATIENT)
Facility: HOSPITAL | Age: 36
Discharge: HOME | End: 2021-05-19
Attending: OBSTETRICS & GYNECOLOGY | Admitting: OBSTETRICS & GYNECOLOGY
Payer: COMMERCIAL

## 2021-05-19 VITALS — DIASTOLIC BLOOD PRESSURE: 65 MMHG | RESPIRATION RATE: 16 BRPM | HEART RATE: 78 BPM | SYSTOLIC BLOOD PRESSURE: 113 MMHG

## 2021-05-19 DIAGNOSIS — O09.529 ANTEPARTUM MULTIGRAVIDA OF ADVANCED MATERNAL AGE: ICD-10-CM

## 2021-05-19 PROCEDURE — 59025 FETAL NON-STRESS TEST: CPT | Performed by: OBSTETRICS & GYNECOLOGY

## 2021-05-19 PROCEDURE — 59025 FETAL NON-STRESS TEST: CPT

## 2021-05-19 NOTE — TELEPHONE ENCOUNTER
Completed FMLA forms faxed to [facility] at [phone], confirmation received. Sent pt a [employer] message.

## 2021-05-20 ENCOUNTER — ROUTINE PRENATAL (OUTPATIENT)
Dept: OBGYN CLINIC | Facility: CLINIC | Age: 36
End: 2021-05-20
Payer: MEDICAID

## 2021-05-20 VITALS — WEIGHT: 169 LBS | DIASTOLIC BLOOD PRESSURE: 68 MMHG | SYSTOLIC BLOOD PRESSURE: 104 MMHG | BODY MASS INDEX: 31 KG/M2

## 2021-05-20 DIAGNOSIS — Z34.83 ENCOUNTER FOR SUPERVISION OF OTHER NORMAL PREGNANCY IN THIRD TRIMESTER: Primary | ICD-10-CM

## 2021-05-20 PROCEDURE — 3074F SYST BP LT 130 MM HG: CPT | Performed by: OBSTETRICS & GYNECOLOGY

## 2021-05-20 PROCEDURE — 81003 URINALYSIS AUTO W/O SCOPE: CPT | Performed by: OBSTETRICS & GYNECOLOGY

## 2021-05-20 PROCEDURE — 3078F DIAST BP <80 MM HG: CPT | Performed by: OBSTETRICS & GYNECOLOGY

## 2021-05-20 PROCEDURE — 0502F SUBSEQUENT PRENATAL CARE: CPT | Performed by: OBSTETRICS & GYNECOLOGY

## 2021-05-20 NOTE — NST
Nonstress Test   Patient: Kvng Montes De Oca    Gestation: 37w6d    Diagnosis from order: Multigravida of advanced maternal age in third trimester  Inpatient order, no diagnosis associated       NST:         NST DOCUMENTATION 5/19/2021   Variability 6

## 2021-05-21 NOTE — TELEPHONE ENCOUNTER
Patient stopped in and filled out HIPPA forms yesterday. She says that she has already sent in the [employer] paperwork from her employer. RABIA received  Payment collected.      Please follow up

## 2021-05-26 ENCOUNTER — HOSPITAL ENCOUNTER (OUTPATIENT)
Facility: HOSPITAL | Age: 36
Discharge: HOME | End: 2021-05-26
Attending: OBSTETRICS & GYNECOLOGY | Admitting: OBSTETRICS & GYNECOLOGY
Payer: COMMERCIAL

## 2021-05-26 ENCOUNTER — APPOINTMENT (OUTPATIENT)
Dept: OBGYN CLINIC | Facility: HOSPITAL | Age: 36
End: 2021-05-26
Payer: COMMERCIAL

## 2021-05-26 ENCOUNTER — NST DOCUMENTATION (OUTPATIENT)
Dept: OBGYN CLINIC | Facility: CLINIC | Age: 36
End: 2021-05-26

## 2021-05-26 VITALS — HEART RATE: 96 BPM | DIASTOLIC BLOOD PRESSURE: 70 MMHG | SYSTOLIC BLOOD PRESSURE: 107 MMHG

## 2021-05-26 DIAGNOSIS — O09.523 AMA (ADVANCED MATERNAL AGE) MULTIGRAVIDA 35+, THIRD TRIMESTER: ICD-10-CM

## 2021-05-26 PROCEDURE — 59025 FETAL NON-STRESS TEST: CPT | Performed by: OBSTETRICS & GYNECOLOGY

## 2021-05-26 PROCEDURE — 59025 FETAL NON-STRESS TEST: CPT

## 2021-05-26 NOTE — PROGRESS NOTES
Pt is a 38year old female admitted to TR2/TR2-A. Patient presents with:  Non-stress Test: AMA     Pt is [id number] 38w6d intra-uterine pregnancy. History obtained, consents signed. Oriented to room, staff, and plan of care.

## 2021-05-27 NOTE — NST
Nonstress Test   Patient: Velta Schaumann    Gestation: 38w6d    Diagnosis from order: Multigravida of advanced maternal age in third trimester       NST: reactive         NST DOCUMENTATION 5/26/2021   Variability 6-25 BPM   Accelerations Yes   Dec

## 2021-05-28 ENCOUNTER — ROUTINE PRENATAL (OUTPATIENT)
Dept: OBGYN CLINIC | Facility: CLINIC | Age: 36
End: 2021-05-28
Payer: MEDICAID

## 2021-05-28 VITALS
BODY MASS INDEX: 32 KG/M2 | DIASTOLIC BLOOD PRESSURE: 71 MMHG | HEART RATE: 75 BPM | SYSTOLIC BLOOD PRESSURE: 115 MMHG | WEIGHT: 171 LBS

## 2021-05-28 DIAGNOSIS — Z34.83 ENCOUNTER FOR SUPERVISION OF OTHER NORMAL PREGNANCY IN THIRD TRIMESTER: Primary | ICD-10-CM

## 2021-05-28 PROCEDURE — 0502F SUBSEQUENT PRENATAL CARE: CPT | Performed by: OBSTETRICS & GYNECOLOGY

## 2021-05-28 PROCEDURE — 81002 URINALYSIS NONAUTO W/O SCOPE: CPT | Performed by: OBSTETRICS & GYNECOLOGY

## 2021-05-28 PROCEDURE — 3078F DIAST BP <80 MM HG: CPT | Performed by: OBSTETRICS & GYNECOLOGY

## 2021-05-28 PROCEDURE — 3074F SYST BP LT 130 MM HG: CPT | Performed by: OBSTETRICS & GYNECOLOGY

## 2021-05-28 NOTE — PROGRESS NOTES
No complaints. Good fetal movements. Cervix fingertip. Labor and rupture membrane precautions. Fetal movement counting. Weekly NSTs.

## 2021-06-01 ENCOUNTER — HOSPITAL ENCOUNTER (INPATIENT)
Facility: HOSPITAL | Age: 36
LOS: 2 days | Discharge: HOME | End: 2021-06-03
Attending: OBSTETRICS & GYNECOLOGY | Admitting: OBSTETRICS & GYNECOLOGY
Payer: COMMERCIAL

## 2021-06-01 ENCOUNTER — TELEPHONE (OUTPATIENT)
Dept: OBGYN CLINIC | Facility: CLINIC | Age: 36
End: 2021-06-01

## 2021-06-01 PROBLEM — Z34.90 PREGNANCY: Status: ACTIVE | Noted: 2021-06-01

## 2021-06-01 PROCEDURE — 59409 OBSTETRICAL CARE: CPT | Performed by: OBSTETRICS & GYNECOLOGY

## 2021-06-01 PROCEDURE — 0KQM0ZZ REPAIR PERINEUM MUSCLE, OPEN APPROACH: ICD-10-PCS | Performed by: OBSTETRICS & GYNECOLOGY

## 2021-06-01 RX ORDER — METHYLERGONOVINE MALEATE 0.2 MG/ML
0.2 INJECTION INTRAVENOUS ONCE
Status: COMPLETED | OUTPATIENT
Start: 2021-06-01 | End: 2021-06-01

## 2021-06-01 RX ORDER — ONDANSETRON 2 MG/ML
4 INJECTION INTRAMUSCULAR; INTRAVENOUS EVERY 6 HOURS PRN
Status: DISCONTINUED | OUTPATIENT
Start: 2021-06-01 | End: 2021-06-01 | Stop reason: HOSPADM

## 2021-06-01 RX ORDER — DOCUSATE SODIUM 100 MG/1
100 CAPSULE, LIQUID FILLED ORAL
Status: DISCONTINUED | OUTPATIENT
Start: 2021-06-01 | End: 2021-06-03

## 2021-06-01 RX ORDER — ACETAMINOPHEN 325 MG/1
650 TABLET ORAL EVERY 6 HOURS PRN
Status: DISCONTINUED | OUTPATIENT
Start: 2021-06-01 | End: 2021-06-03

## 2021-06-01 RX ORDER — DIAPER,BRIEF,INFANT-TODD,DISP
1 EACH MISCELLANEOUS EVERY 6 HOURS PRN
Status: DISCONTINUED | OUTPATIENT
Start: 2021-06-01 | End: 2021-06-03

## 2021-06-01 RX ORDER — BISACODYL 10 MG
10 SUPPOSITORY, RECTAL RECTAL ONCE AS NEEDED
Status: DISCONTINUED | OUTPATIENT
Start: 2021-06-01 | End: 2021-06-03

## 2021-06-01 RX ORDER — IBUPROFEN 600 MG/1
600 TABLET ORAL EVERY 6 HOURS PRN
Status: DISCONTINUED | OUTPATIENT
Start: 2021-06-01 | End: 2021-06-03

## 2021-06-01 RX ORDER — METHYLERGONOVINE MALEATE 0.2 MG/ML
0.2 INJECTION INTRAVENOUS ONCE
Status: DISCONTINUED | OUTPATIENT
Start: 2021-06-01 | End: 2021-06-01

## 2021-06-01 RX ORDER — SIMETHICONE 80 MG
80 TABLET,CHEWABLE ORAL 3 TIMES DAILY PRN
Status: DISCONTINUED | OUTPATIENT
Start: 2021-06-01 | End: 2021-06-03

## 2021-06-01 RX ORDER — ACETAMINOPHEN 500 MG
500 TABLET ORAL EVERY 6 HOURS PRN
Status: DISCONTINUED | OUTPATIENT
Start: 2021-06-01 | End: 2021-06-01

## 2021-06-01 RX ORDER — CHOLECALCIFEROL (VITAMIN D3) 25 MCG
1 TABLET,CHEWABLE ORAL DAILY
Status: DISCONTINUED | OUTPATIENT
Start: 2021-06-01 | End: 2021-06-03

## 2021-06-01 RX ORDER — TERBUTALINE SULFATE 1 MG/ML
0.25 INJECTION, SOLUTION SUBCUTANEOUS AS NEEDED
Status: DISCONTINUED | OUTPATIENT
Start: 2021-06-01 | End: 2021-06-01 | Stop reason: HOSPADM

## 2021-06-01 RX ORDER — LIDOCAINE HYDROCHLORIDE 10 MG/ML
30 INJECTION, SOLUTION EPIDURAL; INFILTRATION; INTRACAUDAL; PERINEURAL ONCE
Status: DISCONTINUED | OUTPATIENT
Start: 2021-06-01 | End: 2021-06-01 | Stop reason: HOSPADM

## 2021-06-01 RX ORDER — AMMONIA INHALANTS 0.04 G/.3ML
0.3 INHALANT RESPIRATORY (INHALATION) AS NEEDED
Status: DISCONTINUED | OUTPATIENT
Start: 2021-06-01 | End: 2021-06-01 | Stop reason: HOSPADM

## 2021-06-01 RX ORDER — DEXTROSE, SODIUM CHLORIDE, SODIUM LACTATE, POTASSIUM CHLORIDE, AND CALCIUM CHLORIDE 5; .6; .31; .03; .02 G/100ML; G/100ML; G/100ML; G/100ML; G/100ML
INJECTION, SOLUTION INTRAVENOUS AS NEEDED
Status: DISCONTINUED | OUTPATIENT
Start: 2021-06-01 | End: 2021-06-01 | Stop reason: HOSPADM

## 2021-06-01 RX ORDER — ONDANSETRON 2 MG/ML
4 INJECTION INTRAMUSCULAR; INTRAVENOUS EVERY 6 HOURS PRN
Status: DISCONTINUED | OUTPATIENT
Start: 2021-06-01 | End: 2021-06-03

## 2021-06-01 RX ORDER — IBUPROFEN 600 MG/1
600 TABLET ORAL EVERY 6 HOURS PRN
Status: DISCONTINUED | OUTPATIENT
Start: 2021-06-01 | End: 2021-06-01 | Stop reason: HOSPADM

## 2021-06-01 RX ORDER — AMMONIA INHALANTS 0.04 G/.3ML
0.3 INHALANT RESPIRATORY (INHALATION) AS NEEDED
Status: DISCONTINUED | OUTPATIENT
Start: 2021-06-01 | End: 2021-06-03

## 2021-06-01 RX ORDER — SODIUM CHLORIDE, SODIUM LACTATE, POTASSIUM CHLORIDE, CALCIUM CHLORIDE 600; 310; 30; 20 MG/100ML; MG/100ML; MG/100ML; MG/100ML
INJECTION, SOLUTION INTRAVENOUS CONTINUOUS
Status: DISCONTINUED | OUTPATIENT
Start: 2021-06-01 | End: 2021-06-01 | Stop reason: HOSPADM

## 2021-06-01 RX ORDER — TRISODIUM CITRATE DIHYDRATE AND CITRIC ACID MONOHYDRATE 500; 334 MG/5ML; MG/5ML
30 SOLUTION ORAL AS NEEDED
Status: DISCONTINUED | OUTPATIENT
Start: 2021-06-01 | End: 2021-06-01 | Stop reason: HOSPADM

## 2021-06-01 RX ORDER — METHYLERGONOVINE MALEATE 0.2 MG/ML
INJECTION INTRAVENOUS
Status: COMPLETED
Start: 2021-06-01 | End: 2021-06-01

## 2021-06-01 NOTE — TELEPHONE ENCOUNTER
OB History     T2    L2    SAB1  TAB0  Ectopic0  Multiple0  Live Births2   39w5d    Patient name and  verified.  Patient calling labor line and believes her water broke this am. Currently still leaking fluid and now feeling contractions but i

## 2021-06-01 NOTE — H&P
[address]  Patient Status:  Inpatient    1985 MRN [id number]   Location [facility] Attending Rosa Paul MD   [facility] Day # 0 PCP Brea Matamoros MD Rfl: , 5/31/2021 at 2100  Cholecalciferol (VITAMIN D3) 1.25 MG (25555 UT) Oral Cap, Take 1 tablet by mouth once a week., Disp: 12 capsule, Rfl: 4, Past Week at Unknown time  Misc.  Devices (BREAST PUMP) Does not apply Misc, DOUBLE ELECTRIC BREAST PUMP EQUIV gestational age of 38w11d with an estimated date of delivery of:  6/3/2021, by Last Menstrual Period    Active phase labor. Obstetrical history significant for GBS colonizer, advanced maternal age.     Admission problem(s):    Pregnancy  Pt presented in act

## 2021-06-01 NOTE — L&D DELIVERY NOTE
[facility] - [facility]    Vaginal Delivery Note    Estefany Hannon Patient Status:  Inpatient    1985 MRN [id number]   Location [address]  Attending Adelia Flores, David Martinez MD   [facility] Day # 0 PCP Nadege Dunham,

## 2021-06-01 NOTE — PROGRESS NOTES
Pt is a 38year old female admitted to LDR3/LDR3-A. Patient presents with:  R/o Rom: leaking since 0930      Pt is [id number] 39w5d intra-uterine pregnancy. History obtained, consents signed. Oriented to room, staff, and plan of care.

## 2021-06-02 NOTE — LACTATION NOTE
This note was copied from a baby's chart.   LACTATION NOTE - INFANT    Evaluation Type  Evaluation Type: Inpatient    Problems & Assessment  Problems Diagnosed or Identified: Latch difficulty  Infant Assessment: Skin color: pink or appropriate for ethnicity normal...

## 2021-06-02 NOTE — LACTATION NOTE
LACTATION NOTE - MOTHER      Evaluation Type: Inpatient    Problems identified  Problems identified: Knowledge deficit;Milk supply not WNL  Milk supply not WNL: Reduced (potential) (declined offer to use HGP when ABM supplements are provided)    Maternal h

## 2021-06-02 NOTE — PROGRESS NOTES
Pt was transferred to [facility] via wheelchair holding baby both in stable condition.  Report given to [facility]

## 2021-06-02 NOTE — PLAN OF CARE
Problem: Patient Centered Care  Goal: Patient preferences are identified and integrated in the patient's plan of care  Description: Interventions:  - What would you like us to know as we care for you?   - Provide timely, complete, and accurate informatio interaction with staff  Outcome: Progressing     Problem: POSTPARTUM  Goal: Long Term Goal:Experiences normal postpartum course  Description: INTERVENTIONS:  - Assess and monitor vital signs and lab values. - Assess fundus and lochia.   - Provide ice/sitz feeding.  - Assess and monitor for signs of nipple pain/trauma. - Instruct and provide assistance with proper latch. - Review techniques for milk expression (breast pumping) and storage of breast milk.  Provide pumping equipment/supplies, instructions and

## 2021-06-02 NOTE — LACTATION NOTE
Erik Darden LACTATION NOTE - MOTHER      Evaluation Type: Inpatient    Problems identified  Problems identified: Knowledge deficit;Milk supply not WNL  Milk supply not WNL: Reduced (potential)    Maternal history  Maternal history: AMA  Other/comment: GBS+    Breastf

## 2021-06-02 NOTE — LACTATION NOTE
LACTATION NOTE - MOTHER      Evaluation Type: Inpatient    Problems identified  Problems identified: Knowledge deficit    Maternal history  Other/comment: none    Breastfeeding goal  Breastfeeding goal: To maintain breast milk feeding per patient goal    M

## 2021-06-02 NOTE — PROGRESS NOTES
PPD 1    Pt doing well. No c/o. Alert and oriented ,nad  abd soft, nontender,fundus firm  Ext nt    Cbc pending this am    A/p PPD 1  Pt doing well. All questions answered.

## 2021-06-03 VITALS
HEART RATE: 73 BPM | DIASTOLIC BLOOD PRESSURE: 62 MMHG | RESPIRATION RATE: 16 BRPM | TEMPERATURE: 98 F | SYSTOLIC BLOOD PRESSURE: 103 MMHG

## 2021-06-03 RX ORDER — IBUPROFEN 600 MG/1
600 TABLET ORAL EVERY 6 HOURS PRN
Qty: 30 TABLET | Refills: 0 | Status: SHIPPED | OUTPATIENT
Start: 2021-06-03 | End: 2021-08-20

## 2021-06-03 NOTE — DISCHARGE SUMMARY
[facility] - [facility]    Discharge Summary    Chloe Mely Patient Status:  Inpatient    1985 MRN [id number]   Location [facility] 3SE Attending Claude Sequin, MD   [facility] Day # 2 PCP Gopi Sullivan MD     Date of Admi information:  [address]  [phone]                         [address]  Ramona Valdez  6/3/2021

## 2021-06-03 NOTE — PLAN OF CARE
Problem: Patient Centered Care  Goal: Patient preferences are identified and integrated in the patient's plan of care  Description: Interventions:  - What would you like us to know as we care for you?   - Provide timely, complete, and accurate informatio breast  Description: INTERVENTIONS:  - Initiate breast feeding within first hour after birth. - Monitor effectiveness of current breast feeding efforts.   - Assess support systems available to mother/family.  - Identify cultural beliefs/practices regardin measures. Outcome: Progressing  Goal: Appropriate maternal -  bonding  Description: INTERVENTIONS:  - Assess caregiver- interactions. - Assess caregiver's emotional status and coping mechanisms.   - Encourage caregiver to participate in newb

## 2021-06-03 NOTE — PLAN OF CARE
Problem: Patient Centered Care  Goal: Patient preferences are identified and integrated in the patient's plan of care  Description: Interventions:  - What would you like us to know as we care for you?   - Provide timely, complete, and accurate informatio Utilize standard precautions and use personal protective equipment as indicated. Ensure aseptic care of all intravenous lines and invasive tubes/drains.  - Obtain immunization and exposure to communicable diseases history.   6/3/2021 1130 by Aditya Davalos, Progressing  Goal: Establishment of adequate milk supply with medication/procedure interruptions  Description: INTERVENTIONS:  - Review techniques for milk expression (breast pumping).    - Provide pumping equipment/supplies, instructions, and assistance un

## 2021-06-03 NOTE — PLAN OF CARE
Problem: Patient Centered Care  Goal: Patient preferences are identified and integrated in the patient's plan of care  Description: Interventions:  - What would you like us to know as we care for you?   - Provide timely, complete, and accurate informatio interaction with staff  Outcome: Completed     Problem: POSTPARTUM  Goal: Long Term Goal:Experiences normal postpartum course  Description: INTERVENTIONS:  - Assess and monitor vital signs and lab values. - Assess fundus and lochia.   - Provide ice/sitz ba feeding.  - Assess and monitor for signs of nipple pain/trauma. - Instruct and provide assistance with proper latch. - Review techniques for milk expression (breast pumping) and storage of breast milk.  Provide pumping equipment/supplies, instructions and

## 2021-06-30 ENCOUNTER — NST DOCUMENTATION (OUTPATIENT)
Dept: OBGYN CLINIC | Facility: CLINIC | Age: 36
End: 2021-06-30

## 2021-07-02 ENCOUNTER — TELEPHONE (OUTPATIENT)
Dept: OBGYN UNIT | Facility: HOSPITAL | Age: 36
End: 2021-07-02

## 2021-07-14 ENCOUNTER — POSTPARTUM (OUTPATIENT)
Dept: OBGYN CLINIC | Facility: CLINIC | Age: 36
End: 2021-07-14
Payer: MEDICAID

## 2021-07-14 VITALS
BODY MASS INDEX: 28 KG/M2 | WEIGHT: 149.81 LBS | DIASTOLIC BLOOD PRESSURE: 67 MMHG | SYSTOLIC BLOOD PRESSURE: 101 MMHG | HEART RATE: 73 BPM

## 2021-07-14 PROCEDURE — 0503F POSTPARTUM CARE VISIT: CPT | Performed by: OBSTETRICS & GYNECOLOGY

## 2021-07-14 PROCEDURE — 3074F SYST BP LT 130 MM HG: CPT | Performed by: OBSTETRICS & GYNECOLOGY

## 2021-07-14 PROCEDURE — 3078F DIAST BP <80 MM HG: CPT | Performed by: OBSTETRICS & GYNECOLOGY

## 2021-07-14 RX ORDER — ACETAMINOPHEN AND CODEINE PHOSPHATE 120; 12 MG/5ML; MG/5ML
0.35 SOLUTION ORAL DAILY
Qty: 3 EACH | Refills: 5 | Status: SHIPPED | OUTPATIENT
Start: 2021-07-14 | End: 2022-07-14

## 2021-07-14 NOTE — PROGRESS NOTES
HPI:   Daryl Mars is a 38year old female who presents for a pp visit.        Wt Readings from Last 6 Encounters:  07/14/21 : 149 lb 12.8 oz (67.9 kg)  05/28/21 : 171 lb (77.6 kg)  05/20/21 : 169 lb (76.7 kg)  05/12/21 : 169 lb (76.7 kg)  05/07 vision  HEENT: denies nasal congestion, sinus pain or ST  LUNGS: denies shortness of breath with exertion  CARDIOVASCULAR: denies chest pain on exertion  GI: denies abdominal pain,denies heartburn  : denies dysuria, vaginal discharge or itching,periods r

## 2021-08-20 ENCOUNTER — OFFICE VISIT (OUTPATIENT)
Dept: FAMILY MEDICINE CLINIC | Facility: CLINIC | Age: 36
End: 2021-08-20
Payer: MEDICAID

## 2021-08-20 VITALS
BODY MASS INDEX: 28 KG/M2 | WEIGHT: 149 LBS | DIASTOLIC BLOOD PRESSURE: 66 MMHG | HEART RATE: 75 BPM | SYSTOLIC BLOOD PRESSURE: 115 MMHG

## 2021-08-20 DIAGNOSIS — L30.9 DERMATITIS: Primary | ICD-10-CM

## 2021-08-20 PROCEDURE — 3074F SYST BP LT 130 MM HG: CPT | Performed by: NURSE PRACTITIONER

## 2021-08-20 PROCEDURE — 99213 OFFICE O/P EST LOW 20 MIN: CPT | Performed by: NURSE PRACTITIONER

## 2021-08-20 PROCEDURE — 3078F DIAST BP <80 MM HG: CPT | Performed by: NURSE PRACTITIONER

## 2021-08-20 RX ORDER — CETIRIZINE HYDROCHLORIDE 10 MG/1
10 TABLET ORAL DAILY
Qty: 90 TABLET | Refills: 0 | Status: SHIPPED | OUTPATIENT
Start: 2021-08-20

## 2021-08-20 NOTE — PATIENT INSTRUCTIONS
Manejo de la dermatitis atópica (eczema)  Para controlar los síntomas y ayudar a reducir la gravedad y la frecuencia de ellos, pruebe los siguientes consejos de cuidado personal:   Cuidados de la piel  · Báñese con un limpiador suave sin perfume (o anum q persisten o empeoran, pregunte a smith proveedor de [employer] otros tratamientos. Decisiones sobre el estilo de elza  · Maneje el estrés en smith elza. · Grover ropa holgada de algodón que no apriete ni roce la piel.   · Evite el uso de prendas de l

## 2021-08-20 NOTE — PROGRESS NOTES
HPI    Patient presents for rash and itching for the past week. Has been using otc creams without relief. Son recently diagnosed with and treated with steroid cream for the same issue. Denies exposure to new soap, detergent or foods.      Review of Syste Weight Concern: Not Asked        Special Diet: Not Asked        Back Care: Not Asked        Exercise: Not Asked        Bike Helmet: Not Asked        Seat Belt: Not Asked        Self-Exams: Not Asked    Social History Narrative      Not on file    Soci sounds: Normal heart sounds. No murmur heard. Pulmonary:      Effort: Pulmonary effort is normal. No respiratory distress. Breath sounds: Normal breath sounds. No stridor. No wheezing, rhonchi or rales. Chest:      Chest wall: No tenderness.    David Randall

## 2021-12-17 ENCOUNTER — IMMUNIZATION (OUTPATIENT)
Dept: FAMILY MEDICINE CLINIC | Facility: CLINIC | Age: 36
End: 2021-12-17
Payer: MEDICAID

## 2021-12-17 DIAGNOSIS — Z23 NEED FOR VACCINATION: Primary | ICD-10-CM

## 2021-12-17 PROCEDURE — 90686 IIV4 VACC NO PRSV 0.5 ML IM: CPT | Performed by: FAMILY MEDICINE

## 2021-12-17 PROCEDURE — 90471 IMMUNIZATION ADMIN: CPT | Performed by: FAMILY MEDICINE

## 2022-07-09 ENCOUNTER — HOSPITAL ENCOUNTER (OUTPATIENT)
Age: 37
Discharge: HOME | End: 2022-07-09
Payer: COMMERCIAL

## 2022-07-09 VITALS
HEIGHT: 60 IN | HEART RATE: 97 BPM | SYSTOLIC BLOOD PRESSURE: 122 MMHG | TEMPERATURE: 98 F | OXYGEN SATURATION: 98 % | BODY MASS INDEX: 27.48 KG/M2 | RESPIRATION RATE: 18 BRPM | WEIGHT: 140 LBS | DIASTOLIC BLOOD PRESSURE: 74 MMHG

## 2022-07-09 DIAGNOSIS — K08.89 PAIN, DENTAL: Primary | ICD-10-CM

## 2022-07-09 DIAGNOSIS — Z32.01 PREGNANCY TEST POSITIVE: ICD-10-CM

## 2022-07-09 LAB — B-HCG UR QL: POSITIVE

## 2022-07-09 RX ORDER — AMOXICILLIN 500 MG/1
500 TABLET, FILM COATED ORAL 3 TIMES DAILY
Qty: 21 TABLET | Refills: 0 | Status: SHIPPED | OUTPATIENT
Start: 2022-07-09 | End: 2022-07-16

## 2022-07-09 NOTE — ED INITIAL ASSESSMENT (HPI)
Pt c/o dental pain x 3 days.  Also states she had a positive pregnancy test at home, wants to repeat the test. Surgeon - Dr Cirilo Rose

## 2022-08-01 ENCOUNTER — OFFICE VISIT (OUTPATIENT)
Dept: OBGYN CLINIC | Facility: CLINIC | Age: 37
End: 2022-08-01
Payer: MEDICAID

## 2022-08-01 VITALS
HEART RATE: 88 BPM | DIASTOLIC BLOOD PRESSURE: 66 MMHG | SYSTOLIC BLOOD PRESSURE: 113 MMHG | WEIGHT: 143 LBS | BODY MASS INDEX: 28 KG/M2

## 2022-08-01 DIAGNOSIS — O36.62X0 EXCESSIVE FETAL GROWTH AFFECTING MANAGEMENT OF PREGNANCY IN SECOND TRIMESTER, SINGLE OR UNSPECIFIED FETUS: ICD-10-CM

## 2022-08-01 DIAGNOSIS — O09.522 MULTIGRAVIDA OF ADVANCED MATERNAL AGE IN SECOND TRIMESTER: Primary | ICD-10-CM

## 2022-08-01 DIAGNOSIS — N92.6 IRREGULAR MENSES: ICD-10-CM

## 2022-08-01 PROBLEM — Z34.90 PREGNANCY: Status: RESOLVED | Noted: 2021-06-01 | Resolved: 2022-08-01

## 2022-08-01 PROBLEM — Z34.90 SUPERVISION OF NORMAL PREGNANCY: Status: RESOLVED | Noted: 2021-01-15 | Resolved: 2022-08-01

## 2022-08-01 PROBLEM — Z01.419 WOMEN'S ANNUAL ROUTINE GYNECOLOGICAL EXAMINATION: Status: ACTIVE | Noted: 2022-08-01

## 2022-08-01 PROCEDURE — 99213 OFFICE O/P EST LOW 20 MIN: CPT | Performed by: OBSTETRICS & GYNECOLOGY

## 2022-08-01 PROCEDURE — 3078F DIAST BP <80 MM HG: CPT | Performed by: OBSTETRICS & GYNECOLOGY

## 2022-08-01 PROCEDURE — 3074F SYST BP LT 130 MM HG: CPT | Performed by: OBSTETRICS & GYNECOLOGY

## 2022-08-08 NOTE — TELEPHONE ENCOUNTER
Routed to [facility] error. [facility]

## 2022-08-09 ENCOUNTER — NURSE ONLY (OUTPATIENT)
Dept: OBGYN CLINIC | Facility: CLINIC | Age: 37
End: 2022-08-09
Payer: COMMERCIAL

## 2022-08-09 DIAGNOSIS — O09.522 MULTIGRAVIDA OF ADVANCED MATERNAL AGE IN SECOND TRIMESTER: Primary | ICD-10-CM

## 2022-08-09 RX ORDER — PNV,CALCIUM 72/IRON/FOLIC ACID 27 MG-1 MG
TABLET ORAL
Qty: 90 TABLET | Refills: 3 | Status: SHIPPED | OUTPATIENT
Start: 2022-08-09

## 2022-08-09 NOTE — PROGRESS NOTES
Pt here today for RN [facility] Education. Missed menses apt with: MARIMAR  LMP: 22    Pre  BMI: 27.93  EPDS score: 0/30  +UPT at home:  Patient unsure   +UPT in office: 22    US: none  Working PORTIA: 2023 by LMP  Hx of genetic abnormality in family: Denies  Hx of varicella:  Had chicken pox   Flu Vaccine: received   Covid Vaccine: not completed      Consent (if needed): n/a      Sterilization/Contraception: Nexplanon     OUD Screening: Pt. Has answered NO 5P questions and has NO  risk factors. Pt. Given What pregnant women need to know handout. Educational material reviewed with patient: Prenatal care, nutrition, weight gain recommendations, travel, exercise, intercourse, pregnancy changes, safe medications, pregnancy and work, fetal movement, labor and  labor, warning signs, food safety, tdap, cord blood, breastfeeding-both, circumcision-no, and Group B strep. Blood transfusion if needed: yes  PN labs: ordered     Optional genetic screening labs were reviewed: Meliton Hernandez, FTS with US, Quad screen MSAFP and CF screening. [facility] [employer] Policy: informed     NOB apt: 22 with BRADFORD.

## 2022-09-21 NOTE — TELEPHONE ENCOUNTER
Panorama screening results obt  Reviewed by DR Smitha Rogers    Results:  low risk  Low Risk for Aneuploidies, triploidy and Monosomy X  Fetal Sex: Female  Fetal Fraction: 13%    LVMW#TCB with questions  My chart message sent  Copy of results sent for scanning into pt record

## 2022-10-31 NOTE — TELEPHONE ENCOUNTER
Order for iron infusion faxed to the [place] [id number] infusion center. LMTCB      Pt placed in follow up book.

## 2022-10-31 NOTE — TELEPHONE ENCOUNTER
----- Message from Josiah Tellez MD sent at 10/31/2022  6:27 AM CDT -----  Please notify patient of persistent low ferritin level. She will need to start IV iron. Please set this up at the infusion center for Venofer 300 mg IV daily x 3 days.     Pt needs 3 hour GTT to be scheduled due to elevated 50 gram.

## 2022-10-31 NOTE — TELEPHONE ENCOUNTER
----- Message from Montse Bocanegra MD sent at 10/31/2022  6:27 AM CDT -----  Please notify patient of persistent low ferritin level. She will need to start IV iron. Please set this up at the infusion center for Venofer 300 mg IV daily x 3 days.     Pt needs 3 hour GTT to be scheduled due to elevated 50 gram.

## 2022-11-09 NOTE — TELEPHONE ENCOUNTER
Pt Name and  verified. Patient informed and verbalized understanding. Pt will complete 3 hour glucose test this weekend.

## 2022-12-29 NOTE — TRIAGE
[facility]      Triage Note    Nonah Nurse Patient Status:  Outpatient    1985 MRN [id number]   Location [address] Attending Diana Courtney MD   [facility] Day # 0 PCP Venita Dart, MD Delia Gilford: [id number]  Estimated Date of Delivery: 23  Gestation: 36w1d    Chief Complaint    Non-stress Test         Allergies:  No Known Allergies    No orders of the defined types were placed in this encounter.       Lab Results   Component Value Date    WBC 6.5 10/29/2022    HGB 12.1 10/29/2022    HCT 37.1 10/29/2022    .0 10/29/2022    CREATSERUM 0.73 2020    BUN 7 2020     2020    K 3.7 2020     2020    CO2 22.0 2020    GLU 92 2020    CA 8.9 2020    ALB 3.9 2020    ALKPHO 68 2020    BILT 0.5 2020    TP 7.7 2020    AST 15 2020    ALT 25 2020    TSH 1.760 2020    B12 >2,000 (H) 2020       Clinitek UA  Lab Results   Component Value Date    GLUUR Negative 2022    SPECGRAVITY 1.015 2022    URINECUL No Growth at 18-24 hrs. 2022       UA  Lab Results   Component Value Date    COLORUR Yellow 2022    CLARITY Clear 2022    SPECGRAVITY 1.015 2022    PROUR Negative 2022    GLUUR Negative 2022    KETUR Negative 2022    BILUR Negative 2022    BLOODURINE Negative 2022    NITRITE Negative 2022    UROBILINOGEN 0.2 2022    LEUUR Negative 2022  1615   BP: 102/63   Pulse: 85       NST  Variability: Moderate           Accelerations: Yes           Decelerations: None            Baseline: 130 BPM           Uterine Irritability: No           Contractions: Not present                                        Acoustic Stimulator: No           Nonstress Test Interpretation: Reactive           Nonstress Test Second Interpretation: Reactive          FHR Category: Category I             Additional Comments       Patient presents with:  Non-stress Test: Non scheduled NST sent from OFFICE to begin NST 2x week. Starting today. Per Dr Sheri Herron at bedside  Dr Jacky Herron discharged pt to home after reviewing the monitor strip. Dr Maxime Pate pt to follow up next prenatal visit and to schedule NST 2x weekly. Pt ambulated off FBC in staBLE CONDITION.     Zack Siddiqui RN  12/29/2022 4:50 PM

## 2022-12-29 NOTE — PROGRESS NOTES
No C/Os. To start weekly NSTs today due to [facility]. GBBS Culture Done. Labor Precautions reviewed.

## 2023-01-05 NOTE — PROGRESS NOTES
No C/Os. Has NST this evening due to [facility]. Labor Precautions reviewed.

## 2023-01-06 NOTE — NST
Nonstress Test   Patient: Matthieu Murillo    Gestation: 37w1d    Diagnosis from order: Multigravida of advanced maternal age in third trimester       NST:         NST DOCUMENTATION 2023   Variability 6-25 BPM   Accelerations Yes   Decelerations None   Baseline 120   Uterine Irritability No   Contractions Irregular   Acoustic Stimulator No   Nonstress Test Interpretation Reactive   Nonstress Test Second Interpretation Reactive   FHR Category Category I   Comments -   NST Completed by TOMMY Ellison RN   Disposition home    Testing Plan Weekly NST   Provider Notified Dr Jae Gomez     Physician Evaluation      NST Interpretation: Reactive  Fetal Surveillance: 120s BPM; Fetal heart variability: moderate  Fetal Heart Rate decelerations: none  Fetal Heart Rate accelerations: yes    Disposition:   Discharged    Comments:  A: 44 y.o.  [id number] with Estimated Date of Delivery: 23 and IUP at 37w1d here for NST due to advanced maternal age. NST is reactive.     Madeleine Vivar MD, MD  2023  11:34 PM

## 2023-01-12 NOTE — PROGRESS NOTES
Pt is a 44year old female admitted to TR1/TR1-A. Patient presents with:  Non-stress Test     Pt is [id number] 38w1d intra-uterine pregnancy. History obtained, consents signed. Oriented to room, staff, and plan of care.

## 2023-01-20 NOTE — NST
Nonstress Test   Patient: Darío Stanford    Gestation: 39w1d    Diagnosis from order: Multigravida of advanced maternal age in third trimester       NST:         NST DOCUMENTATION 2023   Variability 6-25 BPM   Accelerations Yes   Decelerations None   Baseline 125   Uterine Irritability No   Contractions Irregular   Contraction Frequency -   Acoustic Stimulator No   Nonstress Test Interpretation Reactive   Nonstress Test Second Interpretation Reactive   FHR Category -   Comments -   NST Completed by [facility] RN   Disposition HOme    Testing Plan Weekly NST   Provider Notified DR Calle     Physician Evaluation      NST Interpretation: Reactive  Fetal Surveillance: 120's BPM; Fetal heart variability: moderate  Fetal Heart Rate decelerations: none  Fetal Heart Rate accelerations: yes    Disposition:   Discharged    Comments:  A: 44 y.o.  [id number] with Estimated Date of Delivery: 23 and IUP at 39w1d here for NST due to advanced maternal age. NST is reactive.     Polly Mustafa MD, MD  2023  11:50 PM

## 2023-01-24 NOTE — TELEPHONE ENCOUNTER
Pt scheduled for NST with FBC on 1/26 at 10:00am, NST with DODIE on 1/30 at 11:00 after OB visit. Admission for Induction scheduled 1/31 with cervadil. Added to procedure book. Pt verbalized agreement with appointment dates and times. No further questions.

## 2023-01-24 NOTE — TELEPHONE ENCOUNTER
Please schedule patient for NST and DODIE on 1/30/2023 for postdates pregnancy. Her last NST was 1/19/2023, she should also have an NST on 1/25 or 1/26 for advanced maternal age and elevated maternal BMI/maternal obesity. Please schedule patient for induction of labor on Wednesday, 2/1/2023. She should be admitted on Tuesday evening 1/31/2023 for cervical ripening.

## 2023-01-30 NOTE — PROGRESS NOTES
Late entry    This RN received pt report from DINESH Stroud RN and Alisha Youngblood RN. Pt reports she is a 44 yr old  and is 40+5 weeks gestation today. Pt reports she started ctx this morning that are \"9 minutes apart and some feel stronger than other. \" Pt reports +FM and reports \"I had some leaking last night around 11 pm, but I thought I peed myself. \" Pt reports \"some leaking throughout the night and was uncertain if she had ruptured. Pt denies any medical problems during pregnancy and does not desire an epidural and plans on using IV pain meds if needed.

## 2023-01-30 NOTE — L&D DELIVERY NOTE
[facility]    Vaginal Delivery Note    Chad Eng Patient Status:  Inpatient    1985 MRN [id number]   Location [address] [facility] Attending Latoya Barragan MD   [facility] Day # 0 PCP Natalee Dao MD     Delivery     Infant  Date of Delivery: 2023   Time of Delivery: 4:02 PM  Delivery Type: Normal spontaneous vaginal delivery    Infant Sex  Information for the patient's : Denice Nogueira Girl [[id number]]   female    Infant Birthweight  Information for the patient's : Vu Zavala, Neymar [[id number]]   7 lb 11.8 oz (3.51 kg)     Presentation    Position          Apgars:  1 minute: 9               5 minutes: 9                        10 minutes:      Placenta  Date/Time of Delivery: 2023  4:07 PM   Delivery: spontaneous   Placenta to Pathology: no  Cord Gases Submitted: no  Cord Blood Collection: yes  Cord Tissue Collection: no  Cord Complications: none  Sponge and Needle Counts:  Verified yes      Maternal Anesthesia: none   Episiotomy/Laceration Repair  Laceration: none    Delivery Complications  none    Neonatologist Present: no  Delivery Comment:  over intact perineum     Intake/Output   QBL:  See nursing documentation     Tom Underwood MD  2023  4:16 PM    Vu Zavala Girl [[id number]]    Labor Events     labor?: No   steroids?: None  Antibiotics received during labor?: No  Antibiotics (enter # doses in comment): none  Rupture date/time: 2023 2300     Rupture type: SROM  Fluid color: Clear  Induction: None  Augmentation: Oxytocin  Indications for augmentation: Ineffective Contraction Pattern     Labor Event Times    Labor onset date/time: 2023 0200     Spearman Presentation    No data filed     Operative Delivery    No data filed     Shoulder Dystocia    No data filed     Anesthesia     Analgesics:  Analgesics   FENTANYL CITRATE         Spearman Delivery    Head delivery date/time: 2023 16:02:21   Delivery date/time: 23 16:02:31   Delivery type: Normal spontaneous vaginal delivery  Tub Water Temperature: 70    Details:     Delivery location: delivery room     Delivery Providers    Delivering Clinician: Ion Macedo MD   Delivery personnel:  Provider Role    Baby Nurse    Delivery Nurse         Cord    No data filed     Resuscitation    Method: None      Measurements    Weight: 3510 g 7 lb 11.8 oz Length: 50.7 cm   Head circum.: 33.5 cm          Placenta    Date/time: 2023 1607  Removal: Spontaneous  Appearance: Intact  Disposition: Pathology     Apgars    Living status: Living   Apgar Scoring Key:    0 1 2    Skin color Blue or pale Acrocyanotic Completely pink    Heart rate Absent <100 bpm >100 bpm    Reflex irritability No response Grimace Cry or active withdrawal    Muscle tone Limp Some flexion Active motion    Respiratory effort Absent Weak cry; hypoventilation Good, crying              1 Minute:  5 Minute:  10 Minute:  15 Minute:  20 Minute:    Skin color: 1  1       Heart rate: 2  2       Reflex irritablity: 2  2       Muscle tone: 2  2       Respiratory effort: 2  2       Total: 9  9          Apgars assigned by: Ella Conn  [place] disposition: with mother     Skin to Skin    Skin to skin initiated date/time: 2023  Skin to skin with:  Mother     Vaginal Count    Initial count RN: Meghan Krishnan RN  Initial count Tech: Mesha Hives   Sponges   Sharps    Initial counts 10   0    Final counts 10   0    Final count RN: Edwar Poe RN  Final count MD: Ion Macedo MD     Delivery (Maternal)    No data filed

## 2023-01-30 NOTE — PROGRESS NOTES
Pt is a 44year old female [facility] [place] [address]. Patient presents with:  R/o Labor: Contraction every 9-10 min since 0200. +fm. Denies ROM or bleeding. Pt is  40w5d intra-uterine pregnancy. History obtained, consents signed. Oriented to room, staff, and plan of care.

## 2023-01-31 NOTE — PROGRESS NOTES
Received pt in . Pt transferred via [facility] 23 holding baby. VS and assessment WNL. Oriented to room. POC reviewed. Instructed to call for assistance when ready to void. Report received from L&D RN, Ashanti Chao.

## 2023-01-31 NOTE — PROGRESS NOTES
This RN transferred pt from [address] [place] [address] to [facility] in stable condition with infant in arms via AGUSTIN Yarbrough 23.

## 2023-01-31 NOTE — PLAN OF CARE
Problem: Patient Centered Care  Goal: Patient preferences are identified and integrated in the patient's plan of care  Description: Interventions:  - What would you like us to know as we care for you?  - Provide timely, complete, and accurate information to patient/family  - Incorporate patient and family knowledge, values, beliefs, and cultural backgrounds into the planning and delivery of care  - Encourage patient/family to participate in care and decision-making at the level they choose  - Honor patient and family perspectives and choices  Outcome: Progressing     Problem: Patient/Family Goals  Goal: Patient/Family Long Term Goal  Description: Patient's Long Term Goal:     Interventions:  -   - See additional Care Plan goals for specific interventions  Outcome: Progressing  Goal: Patient/Family Short Term Goal  Description: Patient's Short Term Goal:     Interventions:   -   - See additional Care Plan goals for specific interventions  Outcome: Progressing     Problem: POSTPARTUM  Goal: Long Term Goal:Experiences normal postpartum course  Description: INTERVENTIONS:  - Assess and monitor vital signs and lab values. - Assess fundus and lochia. - Provide ice/sitz baths for perineum discomfort. - Monitor healing of incision/episiotomy/laceration, and assess for signs and symptoms of infection and hematoma. - Assess bladder function and monitor for bladder distention.  - Provide/instruct/assist with pericare as needed. - Provide VTE prophylaxis as needed. - Monitor bowel function.  - Encourage ambulation and provide assistance as needed. - Assess and monitor emotional status and provide social service/psych resources as needed. - Utilize standard precautions and use personal protective equipment as indicated. Ensure aseptic care of all intravenous lines and invasive tubes/drains.  - Obtain immunization and exposure to communicable diseases history.   Outcome: Progressing  Goal: Optimize infant feeding at the breast  Description: INTERVENTIONS:  - Initiate breast feeding within first hour after birth. - Monitor effectiveness of current breast feeding efforts. - Assess support systems available to mother/family.  - Identify cultural beliefs/practices regarding lactation, letdown techniques, maternal food preferences. - Assess mother's knowledge and previous experience with breast feeding.  - Provide information as needed about early infant feeding cues (e.g., rooting, lip smacking, sucking fingers/hand) versus late cue of crying.  - Discuss/demonstrate breast feeding aids (e.g., infant sling, nursing footstool/pillows, and breast pumps). - Encourage mother/other family members to express feelings/concerns, and actively listen. - Educate father/SO about benefits of breast feeding and how to manage common lactation challenges. - Recommend avoidance of specific medications or substances incompatible with breast feeding.  - Assess and monitor for signs of nipple pain/trauma. - Instruct and provide assistance with proper latch. - Review techniques for milk expression (breast pumping) and storage of breast milk. Provide pumping equipment/supplies, instructions and assistance, as needed. - Encourage rooming-in and breast feeding on demand.  - Encourage skin-to-skin contact. - Provide LC support as needed. - Assess for and manage engorgement. - Provide breast feeding education handouts and information on community breast feeding support. Outcome: Progressing  Goal: Establishment of adequate milk supply with medication/procedure interruptions  Description: INTERVENTIONS:  - Review techniques for milk expression (breast pumping). - Provide pumping equipment/supplies, instructions, and assistance until it is safe to breastfeed infant. Outcome: Progressing  Goal: Experiences normal breast weaning course  Description: INTERVENTIONS:  - Assess for and manage engorgement. - Instruct on breast care.   - Provide comfort measures. Outcome: Progressing  Goal: Appropriate maternal -  bonding  Description: INTERVENTIONS:  - Assess caregiver- interactions. - Assess caregiver's emotional status and coping mechanisms. - Encourage caregiver to participate in  daily care. - Assess support systems available to mother/family.  - Provide /case management support as needed.   Outcome: Progressing

## 2023-02-01 NOTE — DISCHARGE SUMMARY
[facility]    Discharge Summary    [facility] Patient Status:  Inpatient    1985 MRN [id number]   Location [facility] 3SE Attending Naomy Jordan MD   [facility] Day # 2 PCP Mio Caballero MD     Primary OB Clinician: horace    EDC: Estimated Date of Delivery: 23    Gestational Age: 39w6d    Antepartum complications: anemia    Date of Delivery: 23    Delivered By: horace    Delivery Type: spontaneous vaginal delivery    Tubal Ligation: n/a    Baby: Liveborn      Anesthesia: IV sedation    Intrapartum Complications: None    Laceration: none    Episiotomy: none    Placenta: spontaneous    Feeding Method: breast fed    Discharge Date: 23    Discharge Time: afternoon    Early Discharge:  NO    Alert and oriented, nad  abd soft nontender, fundus firm  Ext nt    Discharge counseling done. Plan:     Address and phone number verified and same. Follow-up appointment with office in 3 weeks. Angus Banks MD  2023  1:25 PM

## 2023-02-01 NOTE — PLAN OF CARE
Problem: Patient Centered Care  Goal: Patient preferences are identified and integrated in the patient's plan of care  Description: Interventions:  - What would you like us to know as we care for you?  - Provide timely, complete, and accurate information to patient/family  - Incorporate patient and family knowledge, values, beliefs, and cultural backgrounds into the planning and delivery of care  - Encourage patient/family to participate in care and decision-making at the level they choose  - Honor patient and family perspectives and choices  Outcome: Progressing     Problem: Patient/Family Goals  Goal: Patient/Family Long Term Goal  Description: Patient's Long Term Goal:     Interventions  -   - See additional Care Plan goals for specific interventions  Outcome: Progressing  Goal: Patient/Family Short Term Goal  Description: Patient's Short Term Goal:     Interventions:   -   - See additional Care Plan goals for specific interventions  Outcome: Progressing     Problem: POSTPARTUM  Goal: Long Term Goal:Experiences normal postpartum course  Description: INTERVENTIONS:  - Assess and monitor vital signs and lab values. - Assess fundus and lochia. - Provide ice/sitz baths for perineum discomfort. - Monitor healing of incision/episiotomy/laceration, and assess for signs and symptoms of infection and hematoma. - Assess bladder function and monitor for bladder distention.  - Provide/instruct/assist with pericare as needed. - Provide VTE prophylaxis as needed. - Monitor bowel function.  - Encourage ambulation and provide assistance as needed. - Assess and monitor emotional status and provide social service/psych resources as needed. - Utilize standard precautions and use personal protective equipment as indicated. Ensure aseptic care of all intravenous lines and invasive tubes/drains.  - Obtain immunization and exposure to communicable diseases history.   Outcome: Progressing  Goal: Optimize infant feeding at the breast  Description: INTERVENTIONS:  - Initiate breast feeding within first hour after birth. - Monitor effectiveness of current breast feeding efforts. - Assess support systems available to mother/family.  - Identify cultural beliefs/practices regarding lactation, letdown techniques, maternal food preferences. - Assess mother's knowledge and previous experience with breast feeding.  - Provide information as needed about early infant feeding cues (e.g., rooting, lip smacking, sucking fingers/hand) versus late cue of crying.  - Discuss/demonstrate breast feeding aids (e.g., infant sling, nursing footstool/pillows, and breast pumps). - Encourage mother/other family members to express feelings/concerns, and actively listen. - Educate father/SO about benefits of breast feeding and how to manage common lactation challenges. - Recommend avoidance of specific medications or substances incompatible with breast feeding.  - Assess and monitor for signs of nipple pain/trauma. - Instruct and provide assistance with proper latch. - Review techniques for milk expression (breast pumping) and storage of breast milk. Provide pumping equipment/supplies, instructions and assistance, as needed. - Encourage rooming-in and breast feeding on demand.  - Encourage skin-to-skin contact. - Provide LC support as needed. - Assess for and manage engorgement. - Provide breast feeding education handouts and information on community breast feeding support. Outcome: Progressing  Goal: Establishment of adequate milk supply with medication/procedure interruptions  Description: INTERVENTIONS:  - Review techniques for milk expression (breast pumping). - Provide pumping equipment/supplies, instructions, and assistance until it is safe to breastfeed infant. Outcome: Progressing  Goal: Experiences normal breast weaning course  Description: INTERVENTIONS:  - Assess for and manage engorgement. - Instruct on breast care.   - Provide comfort measures. Outcome: Progressing  Goal: Appropriate maternal -  bonding  Description: INTERVENTIONS:  - Assess caregiver- interactions. - Assess caregiver's emotional status and coping mechanisms. - Encourage caregiver to participate in  daily care. - Assess support systems available to mother/family.  - Provide /case management support as needed.   Outcome: Progressing

## 2023-02-01 NOTE — PLAN OF CARE
Discharge order received from MD.  Postpartum folder given, discharge medication form reviewed, signed and given to patient. ID Band matched with baby band. Patient has follow up scheduled for 3 weeks with Dr. Tristin Xiong, discussed with patient who verbalized understanding. Mother is interacting appropriately with baby. Verbalized understanding of follow-up instructions. Discharged in stable condition via wheelchair.

## 2023-02-01 NOTE — PLAN OF CARE
Problem: Patient Centered Care  Goal: Patient preferences are identified and integrated in the patient's plan of care  Description: Interventions:  - What would you like us to know as we care for you? Fourth baby, breastfeeding. Infant cluster feeding. Sinhala speaking only,  used with patient during interactions. - Provide timely, complete, and accurate information to patient/family  - Incorporate patient and family knowledge, values, beliefs, and cultural backgrounds into the planning and delivery of care  - Encourage patient/family to participate in care and decision-making at the level they choose  - Honor patient and family perspectives and choices  Outcome: Progressing     Problem: Patient/Family Goals  Goal: Patient/Family Long Term Goal  Description: Patient's Long Term Goal: Healthy postpartum progression    Interventions:  - Lactation support  - Timely assessments  - Pain control per [facility], discussed with patient  - Bleeding control  - Updated per patient and family preferences   - See additional Care Plan goals for specific interventions  Outcome: Progressing  Goal: Patient/Family Short Term Goal  Description: Patient's Short Term Goal: Home with healthy infant    Interventions:   - Discussion of normal vs abnormal signs for her and baby  - Questions answered using   - See additional Care Plan goals for specific interventions  Outcome: Progressing     Problem: POSTPARTUM  Goal: Long Term Goal:Experiences normal postpartum course  Description: INTERVENTIONS:  - Assess and monitor vital signs and lab values. - Assess fundus and lochia. - Provide ice/sitz baths for perineum discomfort. - Monitor healing of incision/episiotomy/laceration, and assess for signs and symptoms of infection and hematoma. - Assess bladder function and monitor for bladder distention.  - Provide/instruct/assist with pericare as needed. - Provide VTE prophylaxis as needed. - Monitor bowel function.   - Encourage ambulation and provide assistance as needed. - Assess and monitor emotional status and provide social service/psych resources as needed. - Utilize standard precautions and use personal protective equipment as indicated. Ensure aseptic care of all intravenous lines and invasive tubes/drains.  - Obtain immunization and exposure to communicable diseases history. Outcome: Progressing  Goal: Optimize infant feeding at the breast  Description: INTERVENTIONS:  - Initiate breast feeding within first hour after birth. - Monitor effectiveness of current breast feeding efforts. - Assess support systems available to mother/family.  - Identify cultural beliefs/practices regarding lactation, letdown techniques, maternal food preferences. - Assess mother's knowledge and previous experience with breast feeding.  - Provide information as needed about early infant feeding cues (e.g., rooting, lip smacking, sucking fingers/hand) versus late cue of crying.  - Discuss/demonstrate breast feeding aids (e.g., infant sling, nursing footstool/pillows, and breast pumps). - Encourage mother/other family members to express feelings/concerns, and actively listen. - Educate father/SO about benefits of breast feeding and how to manage common lactation challenges. - Recommend avoidance of specific medications or substances incompatible with breast feeding.  - Assess and monitor for signs of nipple pain/trauma. - Instruct and provide assistance with proper latch. - Review techniques for milk expression (breast pumping) and storage of breast milk. Provide pumping equipment/supplies, instructions and assistance, as needed. - Encourage rooming-in and breast feeding on demand.  - Encourage skin-to-skin contact. - Provide LC support as needed. - Assess for and manage engorgement. - Provide breast feeding education handouts and information on community breast feeding support.    Outcome: Progressing  Goal: Establishment of adequate milk supply with medication/procedure interruptions  Description: INTERVENTIONS:  - Review techniques for milk expression (breast pumping). - Provide pumping equipment/supplies, instructions, and assistance until it is safe to breastfeed infant. Outcome: Progressing  Goal: Appropriate maternal -  bonding  Description: INTERVENTIONS:  - Assess caregiver- interactions. - Assess caregiver's emotional status and coping mechanisms. - Encourage caregiver to participate in  daily care. - Assess support systems available to mother/family.  - Provide /case management support as needed.   Outcome: Progressing

## 2023-02-11 NOTE — NST
Nonstress Test   Patient: Paradise Leblanc    Gestation: 40w5d    Diagnosis from order: Multigravida of advanced maternal age in third trimester       NST:         NST DOCUMENTATION 2023   Variability 6-25 BPM   Accelerations Yes   Decelerations None   Baseline 125   Uterine Irritability No   Contractions Irregular   Contraction Frequency -   Acoustic Stimulator No   Nonstress Test Interpretation Reactive   Nonstress Test Second Interpretation Reactive   FHR Category -   Comments -   NST Completed by [facility] RN   Disposition Home    Testing Plan Weekly NST   Provider Notified Dr Debi Curtis         I agree with the above evaluation. NST completed.   Elliott Hedrick MD  2023  9:09 AM

## 2023-02-23 NOTE — PROGRESS NOTES
Specialty Comments  Edit  Show all  Report  Postpartum care      HPI: Meño Ambriz is a 44year old  female who presents for a postpartum visit. Delivery Date: 2023    Delivering Physician: Dr. Deven Angel    Type of Delivery:     Gestational Age: 39w6d    Prenatal Complications: none    Delivery Complications: Variable Decels    Episiotomy/Laceration: n/a    Bleeding: none    Incision: n/a    Rhogam: n/a    Rubella Status/Vaccine: immune    Baby's Name: Rosalba Ramirez    Baby's Gender: Girl    [de-identified] Birth Weight: 7lb 11.8oz    Baby's Health: good    Breast or Formula Feeding: breast    Contraception: ocp's    Depression Screen:     Last Pap: 2020/schedule annual 3 months    Concerns:    1. Encounter for postpartum visit       /79   Pulse 73   Wt 155 lb (70.3 kg)   LMP 2022 (Exact Date)   Wt Readings from Last 3 Encounters:  23 : 155 lb (70.3 kg)  23 : 166 lb (75.3 kg)  23 : 166 lb (75.3 kg)    COVID-19 Vaccine(1) Never done  Annual Physical due on 2021  Annual Depression Screening due on 2023  Pap Smear due on 2023  DTaP,Tdap,and Td Vaccines(3 - Td or Tdap) due on 2032  Influenza Vaccine Completed  Pneumococcal Vaccine: Birth to 64yrs Aged Out        Review of Systems   General: Not Present- Anorexia, Fatigue, Fever, Weight Gain > 10lbs. and Weight Loss > 10lbs. Barrie Murrigo HEENT: Not Present- Headache, Visual Disturbances, Vertigo and Bleeding Gums. Breast: Not Present- Breast Mass, Breast Pain, Nipple Discharge and Nipple Pain. Gastrointestinal: Not Present- Abdominal Pain, Change in Bowel Habits, Nausea and Vomiting. Female Genitourinary: Not Present- Dysuria, Flank Pain, Frequency, Hematuria, Incontinence, Pelvic Pain, Urgency, Vaginal Bleeding and Vaginal Discharge. Psychiatric: Not Present- Anxiety, Change in Sleep Pattern and Depression. Endocrine: Not Present- Libido Change.   Hematology: Not Present- Anemia, Easy Bruising, Prolonged Bleeding and Spontaneous Bleeding. All other systems negative       Physical Exam   The physical exam findings are as follows:       General   Mental Status - Alert. General Appearance - Cooperative. Not in acute distress or Sickly. Orientation - Oriented X4. Build & Nutrition - Well nourished. Hydration - Well hydrated. Chest and Lung Exam   Auscultation:   Breath sounds: - Normal.      Breast   Nipples: Discharge - Bilateral - No Colostrum - thick or Colostrum - yellow. Breast - Bilateral - Normal.      Cardiovascular   Auscultation: Rhythm - Regular. Heart Sounds - Normal heart sounds. Abdomen   Inspection: - Inspection Normal.  Palpation/Percussion: Palpation and Percussion of the abdomen reveal - Non Tender and No Palpable abdominal masses. Auscultation: Auscultation of the abdomen reveals - Bowel sounds normal.      Female Genitourinary     External Genitalia   Perineum - Normal. Bartholin's Gland - Bilateral - Normal.  Introitus: Characteristics - Normal. Discharge - None. Labia Majora: Characteristics - Bilateral - Normal.  Labia Minora: Characteristics - Bilateral - Normal.  Urethra: Characteristics - Normal. Discharge - None. [place] Gland - Bilateral - Normal.  Vulva: Characteristics - Normal. Lesions - None. Speculum & Bimanual   Vagina:   Vaginal Wall: - Normal.  Vaginal Lesions - None. Vaginal Mucosa - Normal.  Cervix: Characteristics - No Motion tenderness. Discharge - None. Uterus: Characteristics - Normal. Fundal Height - Just above symphysis pubis. Adnexa: Characteristics - Bilateral - Normal. Masses - No Adnexal Masses. Pelvic Floor Muscles - 0/5. Peripheral Vascular   Lower Extremity: Inspection - Bilateral - Inspection Normal.  Palpation: Edema - Bilateral - No edema. Neurologic   Mental Status: - Normal.    Discussed postpartum care, expectation for menses, contraception, and pregnancy timing.   Discussed with patient activity level postpartum and when she can resume all normal activities. Encourage patient to continue her prenatal vitamin until done breast-feeding. Patient desires condoms at this point for birth control.   Patient scheduled for her routine GYN exam.      1. Encounter for postpartum visit

## 2025-01-04 NOTE — PROGRESS NOTES
Subjective:   Anne-Marie Xavier is a 39 year old female who presents for Ear Pain (Right ear, itchy, can't hear, feels like its blocked, no fever)   Patient is a pleasant 39-year-old female past medical history significant for abnormal Pap smears and Iron deficiency anemia.  Patient presents to office today new to this office and new to this provider to establish care and have ear evaluated.    PMH: None  PSH: ORIF left wrist.   Meds: vitamin D and prenatals     Patient states via Sunitha [id number]. She has issue with right ear. Feels it is blocked. Can't hear as well. This can feel like this in left ear as well. No pain. This started around víctor. Feels different in am. She is pregnant right now. She is 14 weeks. This is her 5th baby. She has lived her for some time. She has not tried anything.           Past Medical History:    Distal radius fracture    Excessive cerumen in ear canal, right    History of abnormal cervical Pap smear    History of pregnancy     x3, missed AB      Past Surgical History:   Procedure Laterality Date    Hand/finger surgery unlisted Left             History/Other:    Chief Complaint Reviewed and Verified  Nursing Notes Reviewed and   Verified  Tobacco Reviewed  Allergies Reviewed  Medications Reviewed    Problem List Reviewed  Medical History Reviewed  Surgical History   Reviewed  OB Status Reviewed  Family History Reviewed  Social History   Reviewed         Tobacco:  She has never smoked tobacco.    Current Outpatient Medications   Medication Sig Dispense Refill    Prenatal Vit-DSS-Fe Fum-FA (PRENATAL 19) 29-1 MG Oral Tab Take 1 tablet by mouth daily. 90 tablet 3    Ferrous Sulfate 325 (65 Fe) MG Oral Tab Take 1 tablet (325 mg total) by mouth daily with breakfast. 30 tablet 3    Cholecalciferol (VITAMIN D) 50 MCG ( UT) Oral Cap Take 1 capsule (2,000 Units total) by mouth daily. 90 capsule 2    loratadine 10 MG Oral Tab Take 1 tablet (10 mg total) by mouth daily. 30  tablet 0    fluticasone propionate 50 MCG/ACT Nasal Suspension 2 sprays by Each Nare route daily for 360 doses. 9.9 mL 0         Review of Systems:  Review of Systems   Constitutional:  Negative for chills and fever.   HENT:  Positive for ear pain, postnasal drip and rhinorrhea. Negative for congestion, sinus pressure and sinus pain.    Respiratory:  Negative for apnea, cough and shortness of breath.    Cardiovascular:  Negative for chest pain.   Gastrointestinal:  Negative for constipation, diarrhea, nausea and vomiting.         Objective:   BP 95/53 (BP Location: Right arm, Patient Position: Sitting, Cuff Size: adult)   Pulse 92   Ht 5' (1.524 m)   Wt 146 lb 12.8 oz (66.6 kg)   LMP 09/25/2024 (Exact Date)   SpO2 99%   Breastfeeding Yes   BMI 28.67 kg/m²  Estimated body mass index is 28.67 kg/m² as calculated from the following:    Height as of this encounter: 5' (1.524 m).    Weight as of this encounter: 146 lb 12.8 oz (66.6 kg).  Physical Exam  Vitals and nursing note reviewed.   Constitutional:       Appearance: Normal appearance.   HENT:      Right Ear: Ear canal and external ear normal.      Left Ear: Tympanic membrane, ear canal and external ear normal.      Ears:      Comments: OME right ear     Nose: Congestion and rhinorrhea present.      Mouth/Throat:      Mouth: Mucous membranes are moist.      Pharynx: Oropharynx is clear.      Comments: +PND  Cardiovascular:      Rate and Rhythm: Normal rate and regular rhythm.      Pulses: Normal pulses.      Heart sounds: Normal heart sounds.   Pulmonary:      Effort: Pulmonary effort is normal. No respiratory distress.      Breath sounds: Normal breath sounds.   Skin:     Capillary Refill: Capillary refill takes less than 2 seconds.   Neurological:      Mental Status: She is alert and oriented to person, place, and time.           Assessment & Plan:   1. Otitis media with effusion, right (Primary)  -     Loratadine; Take 1 tablet (10 mg total) by mouth daily.   Dispense: 30 tablet; Refill: 0  -     Fluticasone Propionate; 2 sprays by Each Nare route daily for 360 doses.  Dispense: 9.9 mL; Refill: 0  2. Nasal congestion  -     Loratadine; Take 1 tablet (10 mg total) by mouth daily.  Dispense: 30 tablet; Refill: 0  -     Fluticasone Propionate; 2 sprays by Each Nare route daily for 360 doses.  Dispense: 9.9 mL; Refill: 0    Patient with exam consistent for rhinitis, congestions and OME right  Pale boggy turbinates, postnasal drip with cobblestoning, and congestion in back of throat.  Swollen Right TM without infection  Patient educated on  rhinitis and congestion of pregnancy  Educated on the use of normal saline rinses to flush out allergens.  Start antihistamine once daily.  Start Flonase once daily bilateral nares.  If symptoms persist will consider amoxicillin Red flags reviewed  Follow-up for physical     Patient aware of plan of care. All questions answered to satisfaction of the patient. Patient instructed to call office or reach out via Pwin[facility]t if any issues arise. For urgent issues and/or reviewed red flags please proceed to the urgent care or ER.  Also, inform the nurse practitioner with any new symptoms or medication side effects.          Return if symptoms worsen or fail to improve, for Annual physical.    MAIN Farah, 1/4/2025, 11:59 AM

## 2025-01-06 NOTE — PROGRESS NOTES
[facility]  Obstetrics and Gynecology  Pregnancy Confirmation  VU [place]     Anne-Marie Xavier is a 39 year old  presenting for pregnancy confirmation. Patient's last menstrual period was 2024 (exact date). Based off LMP patient is about 14 weeks with an edc of 25. Has not been seen at any other clinic during this pregnancy. States cycles were regular prior to last menses.    Patient had a positive pregnancy test on 11/15/2024.  Menarche: 12 years old (2025 10:32 AM)  Period Cycle (Days): 28-30 days (2025 10:32 AM)  Period Duration (Days): 3 days (2025 10:32 AM)  Period Flow: normal (2025 10:32 AM)  Use of Birth Control (if yes, specify type): None (2025 10:32 AM)  Date When Birth Control Last Used: 2024 (2025 10:32 AM)  Hx Prior Abnormal Pap: No (2025 10:32 AM)  Pap Date: 20 (2025 10:32 AM)  Pap Result Notes: normal (2025 10:32 AM)  Follow Up Recommendation: 5 year follow up (2025 10:32 AM)        Latest Ref Rng & Units 2023    11:30 AM 2020     2:52 PM   RECENT PAP RESULTS   INTERPRETATION/RESULT: Negative for intraepithelial lesion or malignancy Negative for intraepithelial lesion or malignancy  Negative for intraepithelial lesion or malignancy    HPV Negative Negative  Negative        Review of Systems   History obtained from the patient  General ROS: No fevers  Breast ROS: No tenderness  Gastrointestinal ROS: nausea, vomiting  Genito-Urinary ROS: no dysuria, trouble voiding, or hematuria  No pelvic cramping or bleeding  OB History     OB History    Para Term  AB Living   6 4 4   1 4   SAB IAB Ectopic Multiple Live Births   1     0 4      # Outcome Date GA Lbr Jai/2nd Weight Sex Type Anes PTL Lv   6 Current            5 Term 23 40w5d 13:50 / 00:12 7 lb 11.8 oz (3.51 kg) F NORMAL SPONT None N EDEN      Complications: Variable decelerations   4 Term 21 39w5d / 16:46 8 lb 7.8 oz (3.85 kg) F  NORMAL SPONT Local N EDEN   3 Term 14 39w0d  8 lb 9 oz (3.884 kg) M Vag-Spont None N EDEN   2 SAB 2006 9w0d          1 Term 03 39w0d  7 lb 9 oz (3.43 kg) F Vag-Spont None N EDEN     Past Medical History     Past Medical History:    Distal radius fracture    Excessive cerumen in ear canal, right    History of abnormal cervical Pap smear    History of pregnancy     x3, missed AB     Past Surgical History     Past Surgical History:   Procedure Laterality Date    Hand/finger surgery unlisted Left     2015     Allergies   Allergies[1]  Medications     Current Outpatient Medications   Medication Sig Dispense Refill    Prenatal Vit-DSS-Fe Fum-FA (PRENATAL 19) 29-1 MG Oral Tab Take 1 tablet by mouth daily. 90 tablet 3    Ferrous Sulfate 325 (65 Fe) MG Oral Tab Take 1 tablet (325 mg total) by mouth daily with breakfast. 30 tablet 3    Cholecalciferol (VITAMIN D) 50 MCG (2000 UT) Oral Cap Take 1 capsule (2,000 Units total) by mouth daily. 90 capsule 2     Family History     Family History   Problem Relation Age of Onset    No Known Problems Father     No Known Problems Mother      Social History     Social History     Socioeconomic History    Marital status: Single     Spouse name: Not on file    Number of children: Not on file    Years of education: Not on file    Highest education level: Not on file   Occupational History    Not on file   Tobacco Use    Smoking status: Never    Smokeless tobacco: Never   Vaping Use    Vaping status: Never Used   Substance and Sexual Activity    Alcohol use: No    Drug use: Never    Sexual activity: Not on file   Other Topics Concern     Service Not Asked    Blood Transfusions Not Asked    Caffeine Concern Yes     Comment: coffee 1 cup    Occupational Exposure Not Asked    Hobby Hazards Not Asked    Sleep Concern Not Asked    Stress Concern Not Asked    Weight Concern Not Asked    Special Diet Not Asked    Back Care Not Asked    Exercise Not Asked    Bike Helmet Not  Asked    Seat Belt Not Asked    Self-Exams Not Asked   Social History Narrative    Not on file     Social Drivers of Health     Financial Resource Strain: Not on file   Food Insecurity: Not on file   Transportation Needs: Not on file   Stress: No Stress Concern Present (2019)    Received from [facility], [facility] of Occupational Health - Occupational Stress Questionnaire     Feeling of Stress : Not at all   Housing Stability: Not on file     Exam   /71 (BP Location: Right arm, Patient Position: Sitting, Cuff Size: adult)   Pulse 91   Wt 146 lb (66.2 kg)   LMP 2024 (Exact Date)   BMI 28.51 kg/m²     GENERAL: well developed, well nourished, in no apparent distress  ABDOMEN: Soft, non distended; non tender, no masses  EXTREMITIES:  non tender without edema  Ultrasound   Limited bedside ultrasound: Live IUP, FHTs 144  Assessment   Anne-Marie is a 39 year old female  with positive pregnancy test in first trimester AMA, 4 previous vaginal deliveries      ICD-10-CM    1. Missed menses  N92.6 POC Urine pregnancy test [51136]     CBC W Differential W Platelet     Rubella, IGG     Antibody Screen     T PALLIDUM SCREENING CASCADE     Hepatitis B Surface Antigen     Blood Type, ABO And Rh D     Urine Culture, Routine     HIV AG AB Combo     HCV Antibody      2. Multigravida of advanced maternal age in first trimester (HCC)  O09.521 CBC W Differential W Platelet     Rubella, IGG     Antibody Screen     T PALLIDUM SCREENING CASCADE     Hepatitis B Surface Antigen     Blood Type, ABO And Rh D     Urine Culture, Routine     HIV AG AB Combo     HCV Antibody     Maternal Fetal Medicine Referral - [place] Location     Chlamydia/GC PCR Combo          Plan   Discussed signs/symptoms of early pregnancy.  Discussed diet, exercise, activity and prenatal vitamins.  Miscarriage precautions given.  Discussed routine prenatal labs which will be done at RN  visit.  Pt counseled on first trimester screen.  Discussed flu vaccine recommendations and is interested today.  Patient to complete 1st trimester blood work   Referral for FTS with MFM provided  Pt to follow up for RN education visit and initial OB visit.    DEVYN RUELAS PA-C  10:37 AM  1/6/2025       [1] No Known Allergies

## 2025-01-23 NOTE — TELEPHONE ENCOUNTER
Patient verified name and     Patient informed order has been placed for radiology. Number provided for scheduling. Patient in agreement to plan.

## 2025-01-23 NOTE — TELEPHONE ENCOUNTER
Patient is 16 wks preg and states she was not able to schedule Level  Detailed Anatomy Ultrasound due to patient on second trimester  Patient is asking what can be done  Please advs

## 2025-01-23 NOTE — TELEPHONE ENCOUNTER
Left message to call back.    Jamila is okay with dating ultrasound through radiology. Order placed.

## 2025-01-24 NOTE — TELEPHONE ENCOUNTER
----- Message from DEVYN RUELAS sent at 1/24/2025 11:38 AM CST -----  Mild anemia noted, patient should be taking iron supplementation every other day. Remainder of labs normal. Please notify patient, thanks!

## 2025-01-29 NOTE — PROGRESS NOTES
Pt called today for RN OB Education.   LMP: 24    Pre  BMI: 27    EPDS score: 0    Working PORTIA: 25 based on LMP  Hx of genetic abnormality in family: None  Hx of varicella: chicken pox      Consent (if needed): None     Sterilization/Contraception: Yes     OUD Screening: Pt. Has answered NO 5P questions and has NO  risk factors.    Pt. Given What pregnant women need to know handout.     SDOH Screening: Pt. Has answered NO 5P questions and has NO  risk factors.    Pt. Given What pregnant women need to know handout.    Educational material reviewed with patient: Prenatal care, nutrition, weight gain recommendations, travel, exercise, intercourse, pregnancy changes, safe medications, pregnancy and work, fetal movement, labor and  labor, warning signs, food safety, tdap, cord blood, breastfeeding, circumcision, and Group B strep.         Blood transfusion if needed: Yes    PN labs: Previously ordered, adding A1C, ferritin    ASA Therapy (2 tablets,inform pt to start at 12 wks not before): Informed  Iron Supplementation (325 mg every other day): Informed  Vitamin D (2,000 IUs daily): Informed  Calcium (1 gram Daily): Informed     Optional genetic screening labs were reviewed: Cell FreeDNA, FTS with US, Quad screen MSAFP and CF screening.     Pt wants to call us back regarding decision.    (Before scheduling, ask the following: location preference and provider language preference)    NOB apt:  Apt scheduled with Dr. Chaudhry 25.     Disclaimer: The calendars that will be provided at your appointment, are a practice guideline and can change based on the individual.

## 2025-02-11 NOTE — PROGRESS NOTES
[facility]  Obstetrics and Gynecology  History & Physical    CC: Establish prenatal care     Subjective:     HPI:  Anne-Marie Xavier is a 39 year old  female at 19w6d who presents today to establish prenatal care.   Partner is not here.   Same FOB.  Late prenatal care. Never had dating US. Has US tomorrow.       Patient's last menstrual period was 2024 (exact date).   LMP - certain  Menses - regular    Pregnancy was unplanned but desired. Has not had dating US.   Was on ocps but stopped  Plan to keep/continue pregnancy? n   Folic acid prior to conceiving? n  Currently taking PNV containing iron? y    Symptoms this pregnancy:   Vaginal bleeding during pregnancy? n  Pelvic cramping/pain? n   Abnormal vaginal discharge? n   Nausea? n   Vomiting?   Constipation? n   Dysuria? n  Headaches? n   Mood n    Partner/Father of the baby   -Medical conditions? n  -FHx of genetic diseases or birth defects? n     History of   -GDM? n  -GHTN/Pre-eclampsia? n   Fhx pre-eclampsia? n   - labor? n   -shoulder dystocia? n  -3rd of 4th degree laceration? n  -postpartum hemorrhage? n  -blood transfusion? n   Would accept blood transfusion? y   -VTE? n  -other complications? n     Review of Systems: negative except per HPI     PCP Nickie Alvarado MD     OB:  OB History    Para Term  AB Living   6 4 4   1 4   SAB IAB Ectopic Multiple Live Births   1     0 4      # Outcome Date GA Lbr Jai/2nd Weight Sex Type Anes PTL Lv   6 Current            5 Term 23 40w5d 13:50 / 00:12 7 lb 11.8 oz (3.51 kg) F NORMAL SPONT None N EDEN      Complications: Variable decelerations   4 Term 21 39w5d / 16:46 8 lb 7.8 oz (3.85 kg) F NORMAL SPONT Local N EDEN   3 Term 14 39w0d  8 lb 9 oz (3.884 kg) M Vag-Spont None N EDEN   2 SAB  9w0d          1 Term 03 39w0d  7 lb 9 oz (3.43 kg) F Vag-Spont None N EDEN       GYN:   STD denies  Abn pap? n  Pap nil, hpv neg on 2023    PMH:   Past Medical History:   Diagnosis Date     Distal radius fracture     Excessive cerumen in ear canal, right 02/10/2021    History of abnormal cervical Pap smear     History of pregnancy 2003, 2006, , 2014     x3, missed AB    Migraine headache with aura     Migraine headache without aura         PSH:    Past Surgical History:   Procedure Laterality Date    Hand/finger surgery unlisted Left            MEDS:  Medications Ordered Prior to Encounter[1]    Allergies:    Allergies[2]    Immunizations:  Immunization History   Administered Date(s) Administered    FLULAVAL 6 months & older 0.5 ml Prefilled syringe (42458) 2020, 2021, 10/04/2022    FLUZONE 6 months and older PFS 0.5 ml (04637) 10/10/2013, 2020    Influenza 10/10/2013, 10/21/2015    Influenza Vaccine, trivalent (IIV3), PF 0.5mL ([id number]) 2025    TDAP 2021, 2022       Family Hx:   Family History   Problem Relation Age of Onset    No Known Problems Father     No Known Problems Mother        SocialHx:    Social History     Socioeconomic History    Marital status: Single   Tobacco Use    Smoking status: Never     Passive exposure: Never    Smokeless tobacco: Never   Vaping Use    Vaping status: Never Used   Substance and Sexual Activity    Alcohol use: No    Drug use: Never   Other Topics Concern    Caffeine Concern Yes     Comment: coffee 1 cup     Social Drivers of Health     Food Insecurity: No Food Insecurity (2025)    Food Insecurity     Food Insecurity: Never true   Transportation Needs: No Transportation Needs (2025)    Transportation Needs     Lack of Transportation: No   Stress: Unknown (2025)    Stress     Feeling of Stress : Patient declined   Housing Stability: Low Risk  (2025)    Housing Stability     Housing Instability: No       Objective:   /62   Pulse 63   Wt 154 lb (69.9 kg)   LMP 2024 (Exact Date)   BMI 30.08 kg/m²   Estimated body mass index is 30.08 kg/m² as calculated from the following:    Height  as of 25: 5' (1.524 m).    Weight as of this encounter: 154 lb (69.9 kg).    PE:  Gen: alert & oriented, no apparent distress      Fetal Well Being Assessment:    140bpm    Depression Scale Total: 0 (2025  3:13 PM)    PHQ-2 SCORE: 0  , done 2025          Based on the available data, low-dose aspirin is advised for women at high risk for preeclampsia. There is no consensus on the criteria that confer high risk. The [place] Preventive Services Task Force (USPSTF) risk criteria are reasonable.  USPSTF criteria for high risk include one or more of the following:   Previous pregnancy with preeclampsia, especially early onset and with an adverse outcome   Multifetal gestation  Chronic hypertension   Type 1 or 2 diabetes mellitus  Chronic kidney disease  Autoimmune disease (antiphospholipid syndrome, systemic lupus erythematosus)     Women with multiple moderate risk factors for preeclampsia are considered high risk, as well. Identification of women with an appropriate combination of moderate risk factors to be considered high risk is subjective and determined case by case, as the data describing the magnitude of the association between each of these risk factors and development of preeclampsia vary widely and lack consistency. USPSTF criteria for moderate risk include:  Nulliparity  Obesity (body mass index >30 kg/m2)  Family history of preeclampsia in mother or sister  Age >=35 years  Sociodemographic characteristics (, low socioeconomic level)  Personal risk factors (eg, history of low birth weight or small for gestational age, previous adverse pregnancy outcome, >10 year pregnancy interval)    Assessment/Plan:     Anne-Marie Xavier is a 39 year old  female at 19w6d - establish prenatal care.    Diagnoses and all orders for this visit:    Supervision of high-risk pregnancy of elderly multigravida (HCC)  -     AFP, Maternal Serum; Future  -     Maternal Fetal Medicine Referral -  [facility]  -     aspirin 81 MG Oral Tab EC; Take 1 tablet (81 mg total) by mouth daily.  -     Cholecalciferol (VITAMIN D) 50 MCG (2000 UT) Oral Cap; Take 1 capsule (2,000 Units total) by mouth daily.    Pelvic pain  -     Pelvic Floor Therapy - [facility]    Late prenatal care affecting pregnancy in second trimester (HCC)         Dating: by LMP, has US tomorrow    #AMA: advised asa 81mg daily  - referral to MFM given for LV2 US and consult    - msafp today    -Aneuploidy screening options discussed  cfDNA/NIPS desires today    -COVID-19 vaccination encouraged      BMI 30  -Wt gain goal  discussed.     Disc. adequate nutrition, folic acid supplementation, and PNVs, dietary restrictions, including no raw fish/meat/eggs; mercury - containing fish, unpasteurized dairy/soft cheeses. Ok for 100-200mg caffeine intake.  Disc dry diet and treatment for N/V of early pregnancy, handout given.    SAB precautions; RTC for VB, spotting, cramping, pain or discharge.    Would accept blood products prn  Risk factors for diabetes: early screening in first trimester with fasting glucose and hgb A1c  Consider increased folic acid for patients with bariatric surgery      Pap UTD  Prenatal labs ordered and reviewed     RTC 4 wk    Danita Chaudhry MD, FACOG  [facility] - OBGYN             [1]   Current Outpatient Medications on File Prior to Visit   Medication Sig Dispense Refill    Prenatal MV-Min-Fe Fum-FA-DHA (PRENATAL 1 OR) Take by mouth.      Ferrous Sulfate 325 (65 Fe) MG Oral Tab Take 1 tablet (325 mg total) by mouth daily with breakfast. 30 tablet 3    Cholecalciferol (VITAMIN D) 50 MCG (2000 UT) Oral Cap Take 1 capsule (2,000 Units total) by mouth daily. (Patient not taking: Reported on 2/11/2025) 90 capsule 2    loratadine 10 MG Oral Tab Take 1 tablet (10 mg total) by mouth daily. (Patient not taking: Reported on 2/11/2025) 30 tablet 0    fluticasone propionate 50 MCG/ACT Nasal Suspension 2 sprays by Each Nare route  daily for 360 doses. (Patient not taking: Reported on 2/11/2025) 9.9 mL 0     No current facility-administered medications on file prior to visit.   [2] No Known Allergies

## 2025-02-13 NOTE — TELEPHONE ENCOUNTER
Using language line  [id number] returned patient's call to schedule Level 2.  Patient is calling at 20 1/7.   placed 2 calls, no answer, 3rd attempt LVM

## 2025-02-17 NOTE — TELEPHONE ENCOUNTER
Incoming Fax received from [facility] with patient's Panorama test results.    Sample collection date: 2/11  Report date: 2/16    Results: low risk  Low Risk for Aneuploidies and Microdeletions  Fetal Sex: in snapshot if pt wishes to know  Fetal Fraction:  25.4%    Routed to Dr. Chaudhry. Sent to scanning.

## 2025-02-21 NOTE — TELEPHONE ENCOUNTER
Incoming Fax received from [facility] with patient's Carrier screening results    Sample collection date: 2/11/25  Report date: 2/20/25    Results: Negative Carrier     Routing to ordering provider for review. Sent to scanning

## 2025-02-26 NOTE — PROGRESS NOTES
Reason for Consult:   Dear Dr. Chaudhry,    Thank you for requesting ultrasound evaluation and maternal fetal medicine consultation on Anne-Marie Jane.  As you are aware she is a 39 year old female with a Richardson pregnancy .  A maternal-fetal medicine consultation was requested secondary to  AMA.  Her prenatal records and labs were reviewed.    Review of History:     OB History:    OB History    Para Term  AB Living   6 4 4 0 1 4   SAB IAB Ectopic Multiple Live Births   1 0 0 0 4      # Outcome Date GA Lbr Jai/2nd Weight Sex Type Anes PTL Lv   6 Current            5 Term 23 40w5d 13:50 / 00:12 7 lb 11.8 oz (3.51 kg) F NORMAL SPONT None N EDEN      Complications: Variable decelerations      Name: EDUIN JANE      Apgar1: 9  Apgar5: 9   4 Term 21 39w5d / 16:46 8 lb 7.8 oz (3.85 kg) F NORMAL SPONT Local N EDEN      Name: LUCINDA BROWN,GIRL      Apgar1: 9  Apgar5: 9   3 Term 14 39w0d  8 lb 9 oz (3.884 kg) M Vag-Spont None N EDEN   2 SAB  9w0d          1 Term 03 39w0d  7 lb 9 oz (3.43 kg) F Vag-Spont None N EDEN             Allergies:  Allergies[1]   Current Meds:  Current Outpatient Medications   Medication Sig Dispense Refill    Prenatal MV-Min-Fe Fum-FA-DHA (PRENATAL 1 OR) Take by mouth.      aspirin 81 MG Oral Tab EC Take 1 tablet (81 mg total) by mouth daily. 180 tablet 3    Cholecalciferol (VITAMIN D) 50 MCG (2000 UT) Oral Cap Take 1 capsule (2,000 Units total) by mouth daily. 90 capsule 2    Ferrous Sulfate 325 (65 Fe) MG Oral Tab Take 1 tablet (325 mg total) by mouth daily with breakfast. 30 tablet 3    Cholecalciferol (VITAMIN D) 50 MCG (2000 UT) Oral Cap Take 1 capsule (2,000 Units total) by mouth daily. (Patient not taking: Reported on 2025) 90 capsule 2    loratadine 10 MG Oral Tab Take 1 tablet (10 mg total) by mouth daily. (Patient not taking: Reported on 2025) 30 tablet 0    fluticasone propionate 50 MCG/ACT Nasal Suspension 2 sprays by Each Nare route  daily for 360 doses. (Patient not taking: Reported on 2025) 9.9 mL 0        HISTORY:  Past Medical History:    Distal radius fracture    Excessive cerumen in ear canal, right    History of abnormal cervical Pap smear    History of pregnancy     x3, missed AB    Migraine headache with aura    Migraine headache without aura      Past Surgical History:   Procedure Laterality Date    Hand/finger surgery unlisted Left     2015      Family History   Problem Relation Age of Onset    No Known Problems Father     No Known Problems Mother       Social History     Socioeconomic History    Marital status: Single   Tobacco Use    Smoking status: Never     Passive exposure: Never    Smokeless tobacco: Never   Vaping Use    Vaping status: Never Used   Substance and Sexual Activity    Alcohol use: No    Drug use: Never   Other Topics Concern    Caffeine Concern Yes     Comment: coffee 1 cup     Social Drivers of Health     Food Insecurity: No Food Insecurity (2025)    Food Insecurity     Food Insecurity: Never true   Transportation Needs: No Transportation Needs (2025)    Transportation Needs     Lack of Transportation: No   Stress: Unknown (2025)    Stress     Feeling of Stress : Patient declined   Housing Stability: Low Risk  (2025)    Housing Stability     Housing Instability: No        NARRATIVE:     /63   Pulse 83   Wt 157 lb (71.2 kg)   LMP 2024 (Exact Date)   BMI 30.66 kg/m²           Alert and Oriented.  No acute distress          Abdomen:  soft, nontender, no contractions noted.           extremities:  nontender, no edema       DISCUSSION  During her visit we discussed and reviewed the following issues:  ADVANCED MATERNAL AGE    Background  I reviewed with the patient that pregnancies in women of advanced maternal age (35 or older at delivery) are associated with elevated risks. Specifically, there is a higher rate of:  Fetal malformations  Preeclampsia  Gestational  diabetes  Intrauterine fetal death    As a result, enhanced pregnancy surveillance is advised for these patients including a comprehensive ultrasound to assess for fetal malformations (at 20 weeks) and a third trimester ultrasound assessment for fetal growth (at 32 weeks). In addition, weekly NST's (initiating at 36 weeks gestation for women 35-39 years and at 32 weeks gestation for women 40 years and older) are also advised. Routine obstetric care is more than adequate to assess for gestational diabetes and preeclampsia; hence, no further significant alterations in obstetric care are advised.    Medical Complications    Women 35 years of age or older can expect to experience two to three fold higher rates of hospitalization,  delivery, and pregnancy-related complications when compared to their younger counterparts.  The two most common medical problems complicating these  pregnanccies are hypertension and diabetes.   The incidence of preeclampsia in the general obstetric population is 3 to 4 percent; this increases to 5 to 10 percent in women over age 40 and is as high as 35 percent in women over age 50.   The incidence of gestational diabetes in the general obstetric population is 3 percent, rising to 7 to 12 percent in women over age 40 and 20 percent in women over age 50.  Women 35 years of age or older are more likely to be delivered by . The  delivery rate in the general obstetric population of the [place] is almost 30 percent, compared to almost 50 percent in women over age 40 to 45 and almost 80 percent in women age 50 to 63.          Fetal Death        A decision analysis tool using data from the [place] [employer]  Database predicted a strategy of weekly antepartum testing and labor induction would lower the risk of unexplained fetal death in women 35 years of age or older. In this model, weekly testing starting at 36 weeks of gestation would drop the risk of fetal  death from 5.2 to 1.3 per 1000 pregnancies. While a policy of antepartum testing in older women does increase the chance that a women will be induced (71 inductions per fetal death averted) and thereby increases her risk of having a  delivery, only 14 additional cesareans would need to be performed to avert one unexplained fetal death.  Hence, weekly NST's are advised for women of advanced maternal age; testing should be initiated at 36 weeks for women 35-39 years and at 32 weeks for women 40 years and older.    Fetal Malformations    Cardiac malformations, clubfoot, and diaphragmatic hernia appear to occur with increased frequency in offspring of older women. These abnormalities are structural and unrelated to aneuploidy, thus they would not be detected by karyotype analysis.  For these reasons a complete, detailed ultrasound (level II) is advised even if the fetus has a normal karyotype.      Fetal Aneuploidy      Invasive Testing  I offered invasive genetic testing (amniocentesis, chorionic villus sampling) after reviewing the diagnostic accuracy of these tests as well as the procedure associated loss rate (1:500 for genetic amniocentesis).        We discussed  the increased risk of chromosomal abnormalities associated with advanced maternal age at age 39 year old. She understands that ultrasound exam cannot exclude potential genetic abnormalities.  Her estimated risk based on maternal age at amniocentesis with any chromosome abnormality is about 1: 40 and with Down Syndrome is about 1: 70.   We also discussed the risks and benefits of having  genetic testing ([facility] and amniocentesis) performed.      UNITY TEST LOW RISK.        OB ULTRASOUND REPORT   See imaging tab for complete ultrasound report or in PACS    Single IUP in cephalic presentation.    Placenta is anterior.   A 3 vessel cord is noted.  Cardiac activity is present at 133 bpm   g ( 1 lb 8 oz);   MVP is 6.3 cm .       Fetal  Anatomy:  Visualized with normal appearance: head, face, spine, neck, skin, chest, abdominal wall, gastrointestinal tract, kidneys, bladder, extremities.   Brain: Visualized and normal appearances: brain parenchyma, cerebral ventricles, choroid plexus, Cisterna Magna, midline falx, cerebellum, cerebellar lobes, posterior fossa, vermis, cavum septi pellucidi.  Face: eyes normal, profile normal, nose normal, lip normal, palate normal.  Heart: visualized and normal appearance: 3 vessel view, four-chamber, left outflow tract, right outflow tract, arches.      Genetic Sonogram:  Nuchal fold normal.  Pyelectasis absent.  No hyperechogenic bowel.  Echogenic intracardiac foci absent. Nasal bone present. Choroid plexus cyst absent.      Summary of Ultrasound findings:  This is a Richardson pregnancy    The fetal measurements are consistent with established EDC. No gross ultrasound evidence of structural abnormalities are seen today. No minor markers for aneuploidy are seen. The patient understands that ultrasound cannot rule out all structural and chromosomal abnormalities.       IMPRESSION:   1. IUP @  22w0d  2. Scan consistent with dates  3. No fetal structural abnormalities seen  4. AMA    RECOMMENDATIONS:   1.  Weekly NST at 32wks  2.  Growth US at 32wks  3.  Twice weekly testing at 38 weeks - weekly NST and weekly BPP.  4.  Delivery at 39-40 weeks.      Thank you for allowing me to participate in the care of your patient.  Please do not hesitate to call with any questions or concerns.     Total time spent   45  minutes this calendar day which includes preparing to see the patient including chart review, obtaining and/or reviewing additional medical history, performing a physical exam and evaluation, documenting clinical information in the electronic medical record, independently interpreting results, counseling the patient, communicating results to the patient/family/caregiver and coordinating care.    Juan Luis Goodrich,  NAVA.O.  Maternal Fetal Medicine     Note to patient and family:  The 21st Century Cures Act makes medical notes available to patients in the interest of transparency.  However, please be advised that this is a medical document.  It is intended as a peer to peer communication.  It is written in medical language and may contain abbreviations or verbiage that are technical and unfamiliar.  It may appear blunt or direct.  Medical documents are intended to carry relevant information, facts as evident, and the clinical opinion of the practitioner.         [1] No Known Allergies

## 2025-03-10 NOTE — TELEPHONE ENCOUNTER
Please review. Protocol Failed; No Protocol    Original rx written 1  with no refills.  Rx is pended, is refill appropriate?

## 2025-03-11 NOTE — PROGRESS NOTES
Anne-Marie Xavier is a 39 year old  at 26w0d here for prisca. Denies uc, lof or vb. +FM.  Saw [facility], finally completed dating US. PORTIA 25 by 22w1d US. Reviewed [facility] recs. Will do gtt/cbc/fe. Tdap next visit. Rtc in 2wk.

## 2025-03-25 NOTE — PROGRESS NOTES
Anne-Marie Xavier is a 39 year old  at 28w0d here for prisca. Denies uc, lof or vb. +FM. Tdap today. Reviewed nl labs, low iron, will supplement. Will do third tri labs. Has growth scan next month. Rtc in 2wk.

## 2025-04-08 NOTE — PROGRESS NOTES
Anne-Marie Xavier is a 39 year old  at 30w0d here for robv. Denies uc, lof or vb. +FM. Still working and stands for prolonged time. Has noticed more increased urinary urge and freq. No dysuria. Will get ua today. Rtc in 2wk.

## 2025-04-22 NOTE — PROGRESS NOTES
[facility]  Obstetrics and Gynecology  Prenatal Visit  Jamila Ruelas PA-C    HPI   Anne-Marie Xavier is a 39 year old.o.  32w0d weeks.    Pt is here for routine prenatal visit. No complaints or concerns.   Denies any regular uterine contractions, spontaneous rupture membranes or vaginal bleeding.  Patient feeling normal fetal movement.    OB History     OB History    Para Term  AB Living   6 4 4  1 4   SAB IAB Ectopic Multiple Live Births   1   0 4      # Outcome Date GA Lbr Jai/2nd Weight Sex Type Anes PTL Lv   6 Current            5 Term 23 40w5d 13:50 / 00:12 7 lb 11.8 oz (3.51 kg) F NORMAL SPONT None N EDEN      Complications: Variable decelerations   4 Term 21 39w5d / 16:46 8 lb 7.8 oz (3.85 kg) F NORMAL SPONT Local N EDEN   3 Term 14 39w0d  8 lb 9 oz (3.884 kg) M Vag-Spont None N EDEN   2 SAB  9w0d          1 Term 03 39w0d  7 lb 9 oz (3.43 kg) F Vag-Spont None N EDEN     Medications   Current Medications[1]  Exam   /78   Wt 166 lb (75.3 kg)   LMP 2024 (Exact Date)   BMI 32.42 kg/m²   FH: 34  FHTs: 145  Assessment   Anne-Marie is a 39 year old female  with viable IUP at 32w0d weeks.      ICD-10-CM    1. Supervision of high-risk pregnancy of elderly multigravida (HCC)  O09.529         Plan   - Scheduled with M tomorrow  - to complete HIV and RPR  - RTC in 2 weeks    JAMILA RUELAS PA-C  2:12 PM  2025         [1]   Current Outpatient Medications   Medication Sig Dispense Refill    Prenatal Vit-DSS-Fe Fum-FA (SE-KARTHIK 19) 29-1 MG Oral Tab       aspirin 81 MG Oral Tab EC Take 1 tablet (81 mg total) by mouth daily. 180 tablet 3    Ferrous Sulfate 325 (65 Fe) MG Oral Tab Take 1 tablet (325 mg total) by mouth daily with breakfast. 30 tablet 3    fluticasone propionate 50 MCG/ACT Nasal Suspension 2 sprays by Nasal route daily. (Patient not taking: Reported on 2025) 16 g 0    Cholecalciferol (VITAMIN D) 50 MCG ( UT) Oral Cap Take 1  capsule (2,000 Units total) by mouth daily. (Patient not taking: Reported on 4/22/2025) 90 capsule 2    Cholecalciferol (VITAMIN D) 50 MCG (2000 UT) Oral Cap Take 1 capsule (2,000 Units total) by mouth daily. (Patient not taking: Reported on 4/22/2025) 90 capsule 2    loratadine 10 MG Oral Tab Take 1 tablet (10 mg total) by mouth daily. (Patient not taking: Reported on 4/22/2025) 30 tablet 0

## 2025-04-23 NOTE — PROGRESS NOTES
Reason for Consult:   Dear Dr. Chaudhry,    Thank you for requesting ultrasound evaluation and maternal fetal medicine consultation on Anne-Marie Jane.  As you are aware she is a 39 year old female with a Richardson pregnancy .  A maternal-fetal medicine consultation was requested secondary to  AMA.  Her prenatal records and labs were reviewed.    Review of History:     OB History:    OB History    Para Term  AB Living   6 4 4 0 1 4   SAB IAB Ectopic Multiple Live Births   1 0 0 0 4      # Outcome Date GA Lbr Jai/2nd Weight Sex Type Anes PTL Lv   6 Current            5 Term 23 40w5d 13:50 / 00:12 7 lb 11.8 oz (3.51 kg) F NORMAL SPONT None N EDEN      Complications: Variable decelerations      Name: EDUIN JANE      Apgar1: 9  Apgar5: 9   4 Term 21 39w5d / 16:46 8 lb 7.8 oz (3.85 kg) F NORMAL SPONT Local N EDEN      Name: LUCINDA BROWN,GIRL      Apgar1: 9  Apgar5: 9   3 Term 14 39w0d  8 lb 9 oz (3.884 kg) M Vag-Spont None N EDEN   2 SAB  9w0d          1 Term 03 39w0d  7 lb 9 oz (3.43 kg) F Vag-Spont None N EDEN             Allergies:  Allergies[1]   Current Meds:  Current Outpatient Medications   Medication Sig Dispense Refill    Prenatal Vit-DSS-Fe Fum-FA (SE- 19) 29-1 MG Oral Tab       fluticasone propionate 50 MCG/ACT Nasal Suspension 2 sprays by Nasal route daily. (Patient not taking: Reported on 2025) 16 g 0    aspirin 81 MG Oral Tab EC Take 1 tablet (81 mg total) by mouth daily. 180 tablet 3    Cholecalciferol (VITAMIN D) 50 MCG (2000 UT) Oral Cap Take 1 capsule (2,000 Units total) by mouth daily. (Patient not taking: Reported on 2025) 90 capsule 2    Ferrous Sulfate 325 (65 Fe) MG Oral Tab Take 1 tablet (325 mg total) by mouth daily with breakfast. 30 tablet 3    Cholecalciferol (VITAMIN D) 50 MCG (2000 UT) Oral Cap Take 1 capsule (2,000 Units total) by mouth daily. (Patient not taking: Reported on 2025) 90 capsule 2    loratadine 10 MG Oral Tab Take  1 tablet (10 mg total) by mouth daily. (Patient not taking: Reported on 2025) 30 tablet 0        HISTORY:  Past Medical History:    Distal radius fracture    Excessive cerumen in ear canal, right    History of abnormal cervical Pap smear    History of pregnancy     x3, missed AB    Migraine headache with aura    Migraine headache without aura      Past Surgical History:   Procedure Laterality Date    Hand/finger surgery unlisted Left           Family History   Problem Relation Age of Onset    No Known Problems Father     No Known Problems Mother       Social History     Socioeconomic History    Marital status: Single   Tobacco Use    Smoking status: Never     Passive exposure: Never    Smokeless tobacco: Never   Vaping Use    Vaping status: Never Used   Substance and Sexual Activity    Alcohol use: No    Drug use: Never   Other Topics Concern    Caffeine Concern Yes     Comment: coffee 1 cup     Social Drivers of Health     Food Insecurity: No Food Insecurity (2025)    Food Insecurity     Food Insecurity: Never true   Transportation Needs: No Transportation Needs (2025)    Transportation Needs     Lack of Transportation: No   Stress: Unknown (2025)    Stress     Feeling of Stress : Patient declined   Housing Stability: Low Risk  (2025)    Housing Stability     Housing Instability: No        NARRATIVE:     /58   Pulse 98   Wt 166 lb (75.3 kg)   LMP 2024 (Exact Date)   BMI 32.42 kg/m²           Alert and Oriented.  No acute distress          Abdomen:  soft, nontender, no contractions noted.           extremities:  nontender, no edema       DISCUSSION  During her visit we discussed and reviewed the following issues:  ADVANCED MATERNAL AGE    Background  I reviewed with the patient that pregnancies in women of advanced maternal age (35 or older at delivery) are associated with elevated risks. Specifically, there is a higher rate of:  Fetal  malformations  Preeclampsia  Gestational diabetes  Intrauterine fetal death      UNITY TEST LOW RISK.          OB ULTRASOUND REPORT   See imaging tab for complete ultrasound report or in PACS    Single IUP in cephalic presentation.    Placenta is anterior.   A 3 vessel cord is noted.  Cardiac activity is present at 143 bpm  EFW 2267 g ( 5 lb 0 oz); 70%.    DODIE is  16.1 cm.  MVP is 4.6 cm        BIOPHYSICAL PROFILE:  Movement:    2/2  Tone:            2/2  Breathin/2  Fluid:             2/2  TOTAL:                 IMPRESSION:   1. IUP @  32w1d   2. Scan consistent with dates  3. No fetal structural abnormalities seen  4. AMA    RECOMMENDATIONS:   1.  Weekly NST at 32wks  2.   Delivery at 39-40 weeks.  3.  Twice weekly testing at 38 weeks - weekly NST and weekly BPP.     line used  #[id number].    Thank you for allowing me to participate in the care of your patient.  Please do not hesitate to call with any questions or concerns.     Total time spent   25  minutes this calendar day which includes preparing to see the patient including chart review, obtaining and/or reviewing additional medical history, performing a physical exam and evaluation, documenting clinical information in the electronic medical record, independently interpreting results, counseling the patient, communicating results to the patient/family/caregiver and coordinating care.    Juan Luis Goodrich D.O.  Maternal Fetal Medicine     Note to patient and family:  The 21st Century Cures Act makes medical notes available to patients in the interest of transparency.  However, please be advised that this is a medical document.  It is intended as a peer to peer communication.  It is written in medical language and may contain abbreviations or verbiage that are technical and unfamiliar.  It may appear blunt or direct.  Medical documents are intended to carry relevant information, facts as evident, and the clinical opinion of the  practitioner.         [1] No Known Allergies

## 2025-04-30 NOTE — PROGRESS NOTES
Pt is a 39 year old female admitted to TR1/TR1-A.     Chief Complaint   Patient presents with    Non-stress Test     Scheduled NST for AMA. +FM.      Pt is  33w1d intra-uterine pregnancy.  History obtained, consents signed. Oriented to room, staff, and plan of care.

## 2025-05-01 NOTE — NST
Nonstress Test   Patient: Anne-Marie Xavier    Gestation: 33w1d    Diagnosis from order: Multigravida of advanced maternal age in third trimester (HCC)    Problem List: Problem List[1]    NST:        2025   NST DOCUMENTATION   Variability 6-25 BPM   Accelerations Yes   Decelerations None   Baseline 130 BPM   Uterine Irritability No   Contractions Irregular   Contraction Frequency x1   Acoustic Stimulator No   Nonstress Test Interpretation Reactive   Nonstress Test Second Interpretation Reactive   FHR Category Category I   Comments , 33.1 weeks, scheduled NST for AMA. +FM.   NST Completed by Ana Herrera RN   Disposition Home    Testing Plan Weekly NST   Provider Notified Dr. Chaudhry         I agree with the above evaluation. NST completed.  Danita Chaudhry MD, MD  2025  9:26 PM             [1]   Patient Active Problem List  Diagnosis    Antepartum multigravida of advanced maternal age (HCC)    Papanicolaou smear of cervix with high grade squamous intraepithelial lesion (HGSIL)    Low serum ferritin level    Pregnancy (HCC)    Vaginal delivery ([facility])

## 2025-05-08 NOTE — PROGRESS NOTES
Pt is a 39 year old female admitted to TR4/TR4-A.     Chief Complaint   Patient presents with    Non-stress Test     Scheduled NST for AMA      Pt is  34w1d intra-uterine pregnancy.  History obtained, consents signed. Oriented to room, staff, and plan of care.

## 2025-05-08 NOTE — PROGRESS NOTES
Anne-Marie Xavier is a 39 year old female  Patient's last menstrual period was 2024 (exact date).   Chief Complaint   Patient presents with    Prenatal Care       OBSTETRICS HISTORY:     OB History    Para Term  AB Living   6 4 4  1 4   SAB IAB Ectopic Multiple Live Births   1   0 4      # Outcome Date GA Lbr Jai/2nd Weight Sex Type Anes PTL Lv   6 Current            5 Term 23 40w5d 13:50 / 00:12 7 lb 11.8 oz (3.51 kg) F NORMAL SPONT None N EDEN      Complications: Variable decelerations   4 Term 21 39w5d / 16:46 8 lb 7.8 oz (3.85 kg) F NORMAL SPONT Local N EDEN   3 Term 14 39w0d  8 lb 9 oz (3.884 kg) M Vag-Spont None N EDEN   2 SAB  9w0d          1 Term 03 39w0d  7 lb 9 oz (3.43 kg) F Vag-Spont None N EDEN       GYNE HISTORY:         Menarche: 12 years old (2025 10:32 AM)  Period Cycle (Days): 28-30 days (2025 10:32 AM)  Period Duration (Days): 3 days (2025 10:32 AM)  Period Flow: normal (2025 10:32 AM)  Use of Birth Control (if yes, specify type): None (2025 10:32 AM)  Date When Birth Control Last Used: 2024 (2025 10:32 AM)  Hx Prior Abnormal Pap: No (2025 10:32 AM)  Pap Date: 20 (2025 10:32 AM)  Pap Result Notes: normal (2025 10:32 AM)  Follow Up Recommendation: 5 year follow up (2025 10:32 AM)        Latest Ref Rng & Units 2023    11:30 AM 2020     2:52 PM   RECENT PAP RESULTS   INTERPRETATION/RESULT: Negative for intraepithelial lesion or malignancy Negative for intraepithelial lesion or malignancy  Negative for intraepithelial lesion or malignancy    HPV Negative Negative  Negative          MEDICAL HISTORY:     Past Medical History[1]    SURGICAL HISTORY:     Past Surgical History[2]    SOCIAL HISTORY:     Short Social Hx on File[3]     FAMILY HISTORY:     Family History[4]    MEDICATIONS:     Medications - Current[5]    ALLERGIES:     Allergies[6]      REVIEW OF SYSTEMS:     Constitutional:    denies fever  / chills  Cardiovascular:  denies chest pain or palpitations  Respiratory:    denies shortness of breath  Gastrointestinal:  denies severe abdominal pain, frequent diarrhea or constipation, nausea / vomiting  Genitourinary:    denies dysuria, bothersome incontinence  Skin/Breast:   denies any breast pain, lumps, or discharge  Neurological:    denies frequent severe headaches  Psychiatric:   denies depression or anxiety, thoughts of harming self or others      PHYSICAL EXAM:   Blood pressure 105/70, pulse 84, weight 168 lb (76.2 kg), last menstrual period 2024, currently breastfeeding.  Constitutional:  well developed, well nourished, no distress  Abdomen:   soft, gravid, nontender  Musculoskeletal: no cva tenderness bilaterally  Skin/Hair:  no unusual rashes or bruises  Extremities:  no edema, no cyanosis, non tender bilaterally  Psychiatric:   oriented to time, place, person and situation. Appropriate mood and affect      ASSESSMENT & PLAN:     Anne-Marie was seen today for prenatal care.    Diagnoses and all orders for this visit:    Antepartum multigravida of advanced maternal age (HCC)      Fetal kick counts discussed with  patient, labor precautions given.      FOLLOW-UP     Return in about 2 weeks (around 2025) for prenatal visit.      Pino Whitehead MD  2025         [1]   Past Medical History:   Distal radius fracture    Excessive cerumen in ear canal, right    History of abnormal cervical Pap smear    History of pregnancy     x3, missed AB    Migraine headache with aura    Migraine headache without aura   [2]   Past Surgical History:  Procedure Laterality Date    Hand/finger surgery unlisted Left        [3]   Social History  Socioeconomic History    Marital status: Single   Tobacco Use    Smoking status: Never     Passive exposure: Never    Smokeless tobacco: Never   Vaping Use    Vaping status: Never Used   Substance and Sexual Activity    Alcohol use: No    Drug use: Never   Other  Topics Concern    Caffeine Concern Yes     Comment: coffee 1 cup     Social Drivers of Health     Food Insecurity: No Food Insecurity (2025)    Food Insecurity     Food Insecurity: Never true   Transportation Needs: No Transportation Needs (2025)    Transportation Needs     Lack of Transportation: No   Stress: Unknown (2025)    Stress     Feeling of Stress : Patient declined   Housing Stability: Low Risk  (2025)    Housing Stability     Housing Instability: No   [4]   Family History  Problem Relation Age of Onset    No Known Problems Father     No Known Problems Mother    [5]   Current Outpatient Medications:     Prenatal Vit-DSS-Fe Fum-FA (SE- 19) 29-1 MG Oral Tab, , Disp: , Rfl:     aspirin 81 MG Oral Tab EC, Take 1 tablet (81 mg total) by mouth daily., Disp: 180 tablet, Rfl: 3    Ferrous Sulfate 325 (65 Fe) MG Oral Tab, Take 1 tablet (325 mg total) by mouth daily with breakfast., Disp: 30 tablet, Rfl: 3  [6] No Known Allergies

## 2025-05-09 NOTE — NST
Nonstress Test   Patient: Anne-Marie Xavier    Gestation: 34w3d    Diagnosis from order: Multigravida of advanced maternal age in third trimester (HCC)    Problem List: Problem List[1]    NST:        2025   NST DOCUMENTATION   Variability 6-25 BPM   Accelerations Yes   Decelerations None   Baseline 130 BPM   Uterine Irritability No   Contractions Not present   Acoustic Stimulator No   Nonstress Test Interpretation Reactive   Nonstress Test Second Interpretation Reactive   FHR Category Category I   Comments  at 34.1 weeks here for scheduled NST due to AMA. Positive fetal movement. Reactive NST   NST Completed by Suzanne MARISCAL   Disposition Home    Testing Plan Weekly NST   Provider Notified          I agree with the above evaluation. NST completed.  Ivone Duckworth MD  2025  3:03 PM             [1]   Patient Active Problem List  Diagnosis    Antepartum multigravida of advanced maternal age (HCC)    Papanicolaou smear of cervix with high grade squamous intraepithelial lesion (HGSIL)    Low serum ferritin level    Pregnancy ([facility])    Vaginal delivery ([facility])

## 2025-05-15 NOTE — PROGRESS NOTES
[facility]  part of [facility]    History & Physical    Anne-Marie Xavier Patient Status:  Outpatient    1985 MRN [id number]   Location [facility] Attending Angus Ramos MD   Hosp Day # 0 PCP Nickie Alvarado MD     Date of Admission:  2025      HPI:   Anne-Marie Xavier is a 39 year old  female, current EGA of 35w1d with an estimated date of delivery of: Estimated Date of Delivery: 25      Anne-Marie Xavier is being admitted for observation.    Her current obstetrical history is significant for advanced maternal age.    Patient reports no complaints and good fetal movement .     Fetal Movement: normal.     History   Obstetric History:   OB History    Para Term  AB Living   6 4 4  1 4   SAB IAB Ectopic Multiple Live Births   1   0 4      # Outcome Date GA Lbr Jai/2nd Weight Sex Type Anes PTL Lv   6 Current            5 Term 23 40w5d 13:50 / 00:12 7 lb 11.8 oz (3.51 kg) F NORMAL SPONT None N EDEN      Complications: Variable decelerations   4 Term 21 39w5d / 16:46 8 lb 7.8 oz (3.85 kg) F NORMAL SPONT Local N EDEN   3 Term 14 39w0d  8 lb 9 oz (3.884 kg) M Vag-Spont None N EDEN   2 SAB  9w0d          1 Term 03 39w0d  7 lb 9 oz (3.43 kg) F Vag-Spont None N EDEN     Past Medical History: Past Medical History[1]  Past Social History: Past Surgical History[2]  Family History: Family History[3]  Social History:   Social History     Tobacco Use    Smoking status: Never     Passive exposure: Never    Smokeless tobacco: Never   Substance Use Topics    Alcohol use: No        Allergies/Medications:   Allergies:   Allergies[4]  Medications:  Prescriptions Prior to Admission[5]    Review of Systems:   As documented in HPI    no complaints and good fetal movement    Physical Exam:   Pulse:  [92] 92  BP: (107)/(67) 107/67    Constitutional: alert, appears stated age, and cooperative  Respiratory: clear to auscultation  bilaterally  Cardiac: regular rate and rhythm, S1, S2 normal, no murmur, click, rub or gallop  Abdomen: FHT present  Fetal Surveillance:  reactive nst, cat 1  Sterile vaginal exam: deferred      Results:     Lab Results   Component Value Date    TREPONEMALAB Nonreactive 2025    HBVSAG Non-Reactive 2013    ABO O 2025    RH Positive 2025    WBC 7.7 2025    HGB 12.0 2025    HCT 35.9 2025    .0 2025    CREATSERUM 0.73 2020    BUN 7 2020     2020    K 3.7 2020     2020    CO2 22.0 2020    GLU 92 2020    CA 8.9 2020    ALB 3.9 2020    ALKPHO 68 2020    BILT 0.5 2020    TP 7.7 2020    AST 15 2020    ALT 25 2020    TSH 1.760 2020       Lab Results   Component Value Date    B12 >2,000 (H) 2020    COLORUR Light-Yellow 2025    CLARITY Clear 2025    SPECGRAVITY 1.007 2025    PROUR Negative 2025    GLUUR Normal 2025    KETUR Negative 2025    BILUR Negative 2025    BLOODURINE Negative 2025    NITRITE Negative 2025    UROBILINOGEN Normal 2025    LEUUR Negative 2025       No results found.      Assessment/Plan:    Anne-Marie Xavier is at an estimated gestational age of 35w1d with an estimated date of delivery of:  Estimated Date of Delivery: 25    Not in labor.  Obstetrical history significant for advanced maternal age.    Admission problem(s):   Pt here for antepartum testing.  No c/o.  Good fm.  All questions answered.       Angus Ramos MD  2025  8:13 PM       [1]   Past Medical History:   Distal radius fracture    Excessive cerumen in ear canal, right    History of abnormal cervical Pap smear    History of pregnancy     x3, missed AB    Migraine headache with aura    Migraine headache without aura   [2]   Past Surgical History:  Procedure Laterality Date    Hand/finger surgery unlisted  Left        [3]   Family History  Problem Relation Age of Onset    No Known Problems Father     No Known Problems Mother    [4] No Known Allergies  [5]   Medications Prior to Admission   Medication Sig Dispense Refill Last Dose/Taking    famotidine 20 MG Oral Tab Take 2 tablets (40 mg total) by mouth at bedtime. 60 tablet 5 2025    Prenatal Vit-DSS-Fe Fum-FA (SE- 19) 29-1 MG Oral Tab    2025    aspirin 81 MG Oral Tab EC Take 1 tablet (81 mg total) by mouth daily. 180 tablet 3 2025    Ferrous Sulfate 325 (65 Fe) MG Oral Tab Take 1 tablet (325 mg total) by mouth daily with breakfast. 30 tablet 3 2025

## 2025-05-15 NOTE — PROGRESS NOTES
Pt is a 39 year old female admitted to TR2/TR2-A.     Chief Complaint   Patient presents with    Non-stress Test     Weekly NST for AMA/BMI      Pt is  35w1d intra-uterine pregnancy.  History obtained, consents signed. Oriented to room, staff, and plan of care.

## 2025-05-22 NOTE — PROGRESS NOTES
HPI:   Anne-Marie Xavier is a 39 year old female who presents for a complete physical exam.      Wt Readings from Last 3 Encounters:   05/22/25 172 lb 9.6 oz (78.3 kg)   05/14/25 168 lb (76.2 kg)   05/08/25 168 lb (76.2 kg)     Body mass index is 33.71 kg/m².       Current Medications[1]   Past Medical History[2]   Past Surgical History[3]   Family History[4]   Social History:   Short Social Hx on File[5]       REVIEW OF SYSTEMS:   Negative, except per HPI.     EXAM:   /64 (BP Location: Right arm, Patient Position: Sitting, Cuff Size: adult)   Pulse 90   Temp 97 °F (36.1 °C)   Ht 5' (1.524 m)   Wt 172 lb 9.6 oz (78.3 kg)   LMP 09/25/2024 (Exact Date)   BMI 33.71 kg/m²     GENERAL: well developed, well nourished, in no apparent distress  SKIN: no rashes, no suspicious lesions  HEENT: atraumatic, normocephalic, ears are clear  EYES: PERRLA, EOMI,conjunctiva are clear  NECK: supple, no adenopathy  LUNGS: clear to auscultation  CARDIO: RRR without murmur  GI: good BS, no masses or tenderness  MUSCULOSKELETAL: back is not tender, FROM of the back  EXTREMITIES: no cyanosis or edema  NEURO: Oriented times three, cranial nerves are intact, motor and sensory are grossly intact    ASSESSMENT AND PLAN:   Anne-Marie Xavier is a 39 year old female who presents for a complete physical exam.    1. Screening mammogram for breast cancer  - [facility] CE 2D+3D SCREENING BILAT (CPT=77067/13001); Future    2. Encounter for annual health examination  -Medical, surgical and social history, as well as medications and allergies were reviewed with patient.  - CBC With Differential With Platelet; Future  - Comp Metabolic Panel (14); Future  - Hemoglobin A1C; Future  - Lipid Panel; Future  - TSH W Reflex To Free T4; Future       Licha Boland MD  5/22/2025  1:46 PM           [1]   Current Outpatient Medications   Medication Sig Dispense Refill    famotidine 20 MG Oral Tab Take 2 tablets (40 mg total) by mouth at bedtime. 60 tablet 5     Prenatal Vit-DSS-Fe Fum-FA (SE-KARTHIK 19) 29-1 MG Oral Tab       aspirin 81 MG Oral Tab EC Take 1 tablet (81 mg total) by mouth daily. 180 tablet 3    Ferrous Sulfate 325 (65 Fe) MG Oral Tab Take 1 tablet (325 mg total) by mouth daily with breakfast. 30 tablet 3   [2]   Past Medical History:   Distal radius fracture    Excessive cerumen in ear canal, right    History of abnormal cervical Pap smear    History of pregnancy     x3, missed AB    Migraine headache with aura    Migraine headache without aura   [3]   Past Surgical History:  Procedure Laterality Date    Hand/finger surgery unlisted Left        [4]   Family History  Problem Relation Age of Onset    No Known Problems Father     No Known Problems Mother    [5]   Social History  Socioeconomic History    Marital status: Single   Tobacco Use    Smoking status: Never     Passive exposure: Never    Smokeless tobacco: Never   Vaping Use    Vaping status: Never Used   Substance and Sexual Activity    Alcohol use: No    Drug use: Never   Other Topics Concern    Caffeine Concern Yes     Comment: coffee 1 cup     Social Drivers of Health     Food Insecurity: No Food Insecurity (2025)    Food Insecurity     Food Insecurity: Never true   Transportation Needs: No Transportation Needs (2025)    Transportation Needs     Lack of Transportation: No   Stress: Unknown (2025)    Stress     Feeling of Stress : Patient declined   Housing Stability: Low Risk  (2025)    Housing Stability     Housing Instability: No

## 2025-05-22 NOTE — PROGRESS NOTES
Pt is a 39 year old female admitted to TR2/TR2-A.     Chief Complaint   Patient presents with    Non-stress Test     Scheduled NST for AMA and elevated BMI.      Pt is  36w1d intra-uterine pregnancy.  History obtained, consents signed. Oriented to room, staff, and plan of care.

## 2025-05-22 NOTE — NST
Nonstress Test   Patient: Anne-Marie Xavier    Gestation: 36w2d    Diagnosis from order: Multigravida of advanced maternal age in third trimester (HCC)    Problem List: Problem List[1]    NST:        2025   NST DOCUMENTATION   Variability 6-25 BPM   Accelerations Yes   Decelerations None   Baseline 125 BPM   Uterine Irritability No   Contractions Not present   Acoustic Stimulator No   Nonstress Test Interpretation Reactive   Nonstress Test Second Interpretation Reactive   Comments  36.1weeks, weekly NST for AMA and obesity. NST reactive, +FM.   NST Completed by Milady MARISCAL   [facility] home    Testing Plan Weekly NST   Provider Notified Isidoro WATSON         I agree with the above evaluation. NST completed.  Danita Chaudhry MD, MD  2025  12:57 AM             [1]   Patient Active Problem List  Diagnosis    Multigravida of advanced maternal age in third trimester (HCC)    Papanicolaou smear of cervix with high grade squamous intraepithelial lesion (HGSIL)    Low serum ferritin level    Pregnancy ([facility])    Vaginal delivery ([facility])

## 2025-05-23 NOTE — PROGRESS NOTES
Anne-Marie Xavier is a 39 year old female  Patient's last menstrual period was 2024 (exact date).   Chief Complaint   Patient presents with    Prenatal Care       OBSTETRICS HISTORY:     OB History    Para Term  AB Living   6 4 4  1 4   SAB IAB Ectopic Multiple Live Births   1   0 4      # Outcome Date GA Lbr Jai/2nd Weight Sex Type Anes PTL Lv   6 Current            5 Term 23 40w5d 13:50 / 00:12 7 lb 11.8 oz (3.51 kg) F NORMAL SPONT None N EDEN      Complications: Variable decelerations   4 Term 21 39w5d / 16:46 8 lb 7.8 oz (3.85 kg) F NORMAL SPONT Local N EDEN   3 Term 14 39w0d  8 lb 9 oz (3.884 kg) M Vag-Spont None N EDEN   2 SAB  9w0d          1 Term 03 39w0d  7 lb 9 oz (3.43 kg) F Vag-Spont None N EDEN       GYNE HISTORY:         Menarche: 12 years old (2025 10:32 AM)  Period Cycle (Days): 28-30 days (2025 10:32 AM)  Period Duration (Days): 3 days (2025 10:32 AM)  Period Flow: normal (2025 10:32 AM)  Use of Birth Control (if yes, specify type): None (2025 10:32 AM)  Date When Birth Control Last Used: 2024 (2025 10:32 AM)  Hx Prior Abnormal Pap: No (2025 10:32 AM)  Pap Date: 20 (2025 10:32 AM)  Pap Result Notes: normal (2025 10:32 AM)  Follow Up Recommendation: 5 year follow up (2025 10:32 AM)        Latest Ref Rng & Units 2023    11:30 AM 2020     2:52 PM   RECENT PAP RESULTS   INTERPRETATION/RESULT: Negative for intraepithelial lesion or malignancy Negative for intraepithelial lesion or malignancy  Negative for intraepithelial lesion or malignancy    HPV Negative Negative  Negative          MEDICAL HISTORY:     Past Medical History[1]    SURGICAL HISTORY:     Past Surgical History[2]    SOCIAL HISTORY:     Short Social Hx on File[3]     FAMILY HISTORY:     Family History[4]    MEDICATIONS:     Medications - Current[5]    ALLERGIES:     Allergies[6]      REVIEW OF SYSTEMS:     Constitutional:    denies fever  / chills  Cardiovascular:  denies chest pain or palpitations  Respiratory:    denies shortness of breath  Gastrointestinal:  denies severe abdominal pain, frequent diarrhea or constipation, nausea / vomiting  Genitourinary:    denies dysuria, bothersome incontinence  Skin/Breast:   denies any breast pain, lumps, or discharge  Neurological:    denies frequent severe headaches  Psychiatric:   denies depression or anxiety, thoughts of harming self or others      PHYSICAL EXAM:   Blood pressure 114/70, weight 171 lb (77.6 kg), last menstrual period 2024, currently breastfeeding.  Constitutional:  well developed, well nourished, no distress  Abdomen:   soft, gravid, nontender  Musculoskeletal: no cva tenderness bilaterally  Skin/Hair:  no unusual rashes or bruises  Extremities:  no edema, no cyanosis, non tender bilaterally  Psychiatric:   oriented to time, place, person and situation. Appropriate mood and affect      ASSESSMENT & PLAN:     Anne-Marie was seen today for prenatal care.    Diagnoses and all orders for this visit:    Multigravida of advanced maternal age in third trimester ([facility])  -     [facility]BG[facility]  OB  LIMITED ([id number])  -     Group B Strep PCR; Future    Fetal kick counts discussed with  patient, labor precautions given.  Gbs done  Basilia for ama wnl today. 19.29 cm      FOLLOW-UP     Return in about 1 week (around 2025) for prenatal visit.      Pino Whitehead MD  2025         [1]   Past Medical History:   Distal radius fracture    Excessive cerumen in ear canal, right    History of abnormal cervical Pap smear    History of pregnancy     x3, missed AB    Migraine headache with aura    Migraine headache without aura   [2]   Past Surgical History:  Procedure Laterality Date    Hand/finger surgery unlisted Left        [3]   Social History  Socioeconomic History    Marital status: Single   Tobacco Use    Smoking status: Never     Passive exposure: Never    Smokeless tobacco: Never    Vaping Use    Vaping status: Never Used   Substance and Sexual Activity    Alcohol use: No    Drug use: Never   Other Topics Concern    Caffeine Concern Yes     Comment: coffee 1 cup     Social Drivers of Health     Food Insecurity: No Food Insecurity (2025)    Food Insecurity     Food Insecurity: Never true   Transportation Needs: No Transportation Needs (2025)    Transportation Needs     Lack of Transportation: No   Stress: Unknown (2025)    Stress     Feeling of Stress : Patient declined   Housing Stability: Low Risk  (2025)    Housing Stability     Housing Instability: No   [4]   Family History  Problem Relation Age of Onset    No Known Problems Father     No Known Problems Mother    [5]   Current Outpatient Medications:     famotidine 20 MG Oral Tab, Take 2 tablets (40 mg total) by mouth at bedtime., Disp: 60 tablet, Rfl: 5    Prenatal Vit-DSS-Fe Fum-FA (SE-KARTHIK 19) 29-1 MG Oral Tab, , Disp: , Rfl:     aspirin 81 MG Oral Tab EC, Take 1 tablet (81 mg total) by mouth daily., Disp: 180 tablet, Rfl: 3    Ferrous Sulfate 325 (65 Fe) MG Oral Tab, Take 1 tablet (325 mg total) by mouth daily with breakfast., Disp: 30 tablet, Rfl: 3  [6] No Known Allergies

## 2025-05-29 NOTE — PROGRESS NOTES
Pt is a 39 year old female admitted to TR3/TR3-A.     Chief Complaint   Patient presents with    Non-stress Test     Scheduled NST for AMA & elevated BMI      Pt is  37w1d intra-uterine pregnancy.  History obtained, consents signed. Oriented to room, staff, and plan of care.

## 2025-05-30 NOTE — NST
Nonstress Test   Patient: Anne-Marie Xavier    Gestation: 37w3d    Diagnosis from order: Multigravida of advanced maternal age in third trimester (HCC)    Problem List: Problem List[1]    NST:        2025   NST DOCUMENTATION   Variability 6-25 BPM   Accelerations Yes   Decelerations None   Baseline 135 BPM   Uterine Irritability No   Contractions Not present   Acoustic Stimulator No   Nonstress Test Interpretation Reactive   Nonstress Test Second Interpretation Reactive   FHR Category Category I   Comments  at 37.1 weeks here for weekly NST due to AMA and elevated BMI. Positive fetal movement and reactive NST   NST Completed by Suzanne RN   Disposition Home    Testing Plan Weekly NST   Provider Notified Isidoro WATSON         I agree with the above evaluation. NST completed.  Danita Chaudhry MD, MD  2025  7:35 AM             [1]   Patient Active Problem List  Diagnosis    Multigravida of advanced maternal age in third trimester (HCC)    Papanicolaou smear of cervix with high grade squamous intraepithelial lesion (HGSIL)    Low serum ferritin level    Pregnancy ([facility])    Vaginal delivery ([facility])    Pap smear abnormality of cervix with LGSIL    Pterygium of both eyes

## 2025-06-02 NOTE — PROGRESS NOTES
Anne-Marie Xavier is a 39 year old  at 37w6d here for robv. Doing well. Occ uc but denies anything regular. Denies vb or lof. +FM. Delivery timing reviewed. Labor/fkc previewed. Cont weekly nst. Rtc in 1wk.

## 2025-06-05 NOTE — NST
Nonstress Test   Patient: Anne-Marie Xavier    Gestation: 38w1d    NST:       Variability: Moderate           Accelerations: Yes           Decelerations: None            Baseline: 135 BPM           Uterine Irritability: No           Contractions: Not present                    Contraction Frequency: 1 noted                   Acoustic Stimulator: No           Nonstress Test Interpretation: Reactive           Nonstress Test Second Interpretation: Reactive                     Additional Comments Comments:  @ 38.1w, scheduled NST for AMA and elevated BMI. NST reactive, +FM noted. Dr. Chaudhry notified.

## 2025-06-05 NOTE — NST
Nonstress Test   Patient: Anne-Marie Xaiver    Gestation: 38w1d    Diagnosis from order: Multigravida of advanced maternal age in third trimester ([facility])  Inpatient order, no diagnosis associated    Problem List: Problem List[1]    NST:        2025   NST DOCUMENTATION   Variability 6-25 BPM   Accelerations Yes   Decelerations None   Baseline 135 BPM   Uterine Irritability No   Contractions Not present   Contraction Frequency 1 noted   Acoustic Stimulator No   Nonstress Test Interpretation Reactive   Nonstress Test Second Interpretation Reactive   Comments  @ 38.1w, scheduled NST for AMA and elevated BMI. NST reactive, +FM noted. Dr. Chaudhry notified.   NST Completed by DINESH Seo RN   Disposition Home    Testing Plan Weekly NST   Provider Notified Dr. Chaudhry         I agree with the above evaluation. NST completed.  Danita Chaudhry MD, MD  2025  9:02 PM             [1]   Patient Active Problem List  Diagnosis    Multigravida of advanced maternal age in third trimester ([facility])    Papanicolaou smear of cervix with high grade squamous intraepithelial lesion (HGSIL)    Low serum ferritin level    Pregnancy ([facility])    Vaginal delivery ([facility])    Pap smear abnormality of cervix with LGSIL    Pterygium of both eyes

## 2025-06-10 NOTE — TELEPHONE ENCOUNTER
See revised date below:      DATE:  TIME - AM/PM: AM  INDUCTION AGENT:  pitocin  ANTIBIOTICS NEEDED:  n  REASON FOR INDUCTION: advanced maternal age  EDC:  Estimated Date of Delivery: 25    Danita Chaudhry MD, FACOG  [facility] - OBGYN

## 2025-06-10 NOTE — TELEPHONE ENCOUNTER
DATE:25  TIME - AM/PM: AM  INDUCTION AGENT:  pitocin  ANTIBIOTICS NEEDED:  n  REASON FOR INDUCTION: advanced maternal age  EDC:  Estimated Date of Delivery: 25    Danita Chaudhry MD, FACOG  [facility]

## 2025-06-10 NOTE — PROGRESS NOTES
Anne-Marie Xavier is a 39 year old  at 39w0d here for robv. Occ ctx. Denies lof or vb. +FM. C/o whole body pruritus. Had recent change in soaps. No itching over palms and soles. Conservative measures discussed. Desires IOL on , case request revised. Will come next week with NST in office. Has her NST tomorrow. Labor/fkc precx reviewed.

## 2025-06-10 NOTE — TELEPHONE ENCOUNTER
I left the patient a voicemail to call back using the language line in regards to her induction date and time

## 2025-06-17 NOTE — PROGRESS NOTES
Anne-Marie Xavier is a 39 year old  at 40w0d here for robv. Doing well. Denies uc, lof or vb. +FM. NST R. No ctx on toco. Has IOL for  at 4am. Labor/fkc precx reviewed.

## 2025-06-20 NOTE — H&P
[facility]  part of [facility]    History & Physical    Anne-Marie Xavier Patient Status:  Inpatient    1985 MRN [id number]   Location [facility] Attending Pino Whitehead MD   Hosp Day # 0 PCP Nickie Alvarado MD     Date of Admission:  2025      HPI:   Anne-Marie Xavier is a 39 year old  female , current EGA of 40w3d with an estimated date of delivery of: 2025, by Ultrasound      Anne-Marie Xavier is being admitted for induction of labor.  For ama  Her current obstetrical history is significant for ama.    Patient reports good fetal movement .     Fetal Movement: normal.     History   Obstetric History:   OB History    Para Term  AB Living   6 4 4  1 4   SAB IAB Ectopic Multiple Live Births   1   0 4      # Outcome Date GA Lbr Jai/2nd Weight Sex Type Anes PTL Lv   6 Current            5 Term 23 40w5d 13:50 / 00:12 7 lb 11.8 oz (3.51 kg) F NORMAL SPONT None N EDEN      Complications: Variable decelerations   4 Term 21 39w5d / 16:46 8 lb 7.8 oz (3.85 kg) F NORMAL SPONT Local N EDEN   3 Term 14 39w0d  8 lb 9 oz (3.884 kg) M Vag-Spont None N EDEN   2 SAB  9w0d          1 Term 03 39w0d  7 lb 9 oz (3.43 kg) F Vag-Spont None N EDEN     Surgical History: Past Surgical History[1]  Past Medical History: Past Medical History[2]  Past Social History: Past Surgical History[3]  Family History: Family History[4]  Social History:   Social History     Tobacco Use    Smoking status: Never     Passive exposure: Never    Smokeless tobacco: Never   Substance Use Topics    Alcohol use: No        Allergies/Medications:   Allergies:   Allergies[5]  Medications:  Prescriptions Prior to Admission[6]    Review of Systems:   As documented in HPI    no complaints    Physical Exam:   Temp:  [98.3 °F (36.8 °C)-98.6 °F (37 °C)] 98.6 °F (37 °C)  Pulse:  [75-80] 76  Resp:  [16] 16  BP: (107-116)/(56-72) 116/72    Constitutional: alert, appears  stated age, and cooperative  Respiratory: clear to auscultation bilaterally  Cardiac: regular rate and rhythm, S1, S2 normal, no murmur, click, rub or gallop  Abdomen: FHT present  NonStressTest:  Fetal Surveillance:  140 BPM Baseline; Fetal heart variability: moderate  Fetal Heart Rate decelerations: none  Fetal Heart Rate accelerations: yes, 15x15 accels over >= 20 minutes  Sterile speculum exam:   Sterile vaginal exam: Dilation: 3  Effacement: 75%  Station: -2  Position: Cephalic    Results:     Lab Results   Component Value Date    TREPONEMALAB Nonreactive 06/20/2025    HBVSAG Non-Reactive 08/23/2013    ABO O 06/20/2025    RH Positive 06/20/2025    WBC 7.5 06/20/2025    HGB 13.4 06/20/2025    HCT 39.3 06/20/2025    .0 06/20/2025    CREATSERUM 0.73 09/26/2020    BUN 7 09/26/2020     09/26/2020    K 3.7 09/26/2020     09/26/2020    CO2 22.0 09/26/2020    GLU 92 09/26/2020    CA 8.9 09/26/2020    ALB 3.9 09/26/2020    ALKPHO 68 09/26/2020    BILT 0.5 09/26/2020    TP 7.7 09/26/2020    AST 15 09/26/2020    ALT 25 09/26/2020    TSH 1.760 09/26/2020       Lab Results   Component Value Date    B12 >2,000 (H) 09/26/2020    COLORUR Light-Yellow 04/08/2025    CLARITY Clear 04/08/2025    SPECGRAVITY 1.007 04/08/2025    PROUR Negative 04/08/2025    GLUUR Normal 04/08/2025    KETUR Negative 04/08/2025    BILUR Negative 04/08/2025    BLOODURINE Negative 04/08/2025    NITRITE Negative 04/08/2025    UROBILINOGEN Normal 04/08/2025    LEUUR Negative 04/08/2025       No results found.      Assessment/Plan:    Anne-Marie Xavier is at an estimated gestational age of 40w3d with an estimated date of delivery of:  6/17/2025, by Ultrasound        Admission problem(s):    Pregnancy (HCC)  Plan: cont pitocin      Reactive NST. Fetal status reassuring        Risks, benefits, alternatives and possible complications have been discussed in detail with the patient.     All questions answered. Follow up:    Pino Whitehead MD    2025  4:35 PM       [1]   Past Surgical History:  Procedure Laterality Date    Hand/finger surgery unlisted Left        [2]   Past Medical History:   Distal radius fracture    Excessive cerumen in ear canal, right    History of abnormal cervical Pap smear    History of pregnancy     x3, missed AB    Migraine headache with aura    Migraine headache without aura   [3]   Past Surgical History:  Procedure Laterality Date    Hand/finger surgery unlisted Left        [4]   Family History  Problem Relation Age of Onset    No Known Problems Father     No Known Problems Mother    [5] No Known Allergies  [6]   Medications Prior to Admission   Medication Sig Dispense Refill Last Dose/Taking    famotidine 20 MG Oral Tab Take 2 tablets (40 mg total) by mouth at bedtime. 60 tablet 5 Past Week    Prenatal Vit-DSS-Fe Fum-FA (SE-KARTHIK 19) 29-1 MG Oral Tab    Past Week    aspirin 81 MG Oral Tab EC Take 1 tablet (81 mg total) by mouth daily. 180 tablet 3 Past Week    Ferrous Sulfate 325 (65 Fe) MG Oral Tab Take 1 tablet (325 mg total) by mouth daily with breakfast. 30 tablet 3 Past Week

## 2025-06-20 NOTE — PROGRESS NOTES
Pt is a 39 year old female admitted to LDR3/LDR3-A.     Chief Complaint   Patient presents with    Scheduled Induction     AMA      Pt is  40w3d intra-uterine pregnancy.  History obtained, consents signed. Oriented to room, staff, and plan of care.

## 2025-06-20 NOTE — PLAN OF CARE
Problem: BIRTH - VAGINAL/ SECTION  Goal: Fetal and maternal status remain reassuring during the birth process  Description: INTERVENTIONS:  - Monitor vital signs  - Monitor fetal heart rate  - Monitor uterine activity  - Monitor labor progression (vaginal delivery)  - DVT prophylaxis (C/S delivery)  - Surgical antibiotic prophylaxis (C/S delivery)  Outcome: Progressing     Problem: PAIN - ADULT  Goal: Verbalizes/displays adequate comfort level or patient's stated pain goal  Description: INTERVENTIONS:  - Encourage pt to monitor pain and request assistance  - Assess pain using appropriate pain scale  - Administer analgesics based on type and severity of pain and evaluate response  - Implement non-pharmacological measures as appropriate and evaluate response  - Consider cultural and social influences on pain and pain management  - Manage/alleviate anxiety  - Utilize distraction and/or relaxation techniques  - Monitor for opioid side effects  - Notify MD/LIP if interventions unsuccessful or patient reports new pain  - Anticipate increased pain with activity and pre-medicate as appropriate  Outcome: Progressing     Problem: ANXIETY  Goal: Will report anxiety at manageable levels  Description: INTERVENTIONS:  - Administer medication as ordered  - Teach and rehearse alternative coping skills  - Provide emotional support with 1:1 interaction with staff  Outcome: Progressing     Problem: Patient Centered Care  Goal: Patient preferences are identified and integrated in the patient's plan of care  Description: Interventions:  - What would you like us to know as we care for you? Patient is having a baby boy, plans to breast and bottle feed and would only like IV pains meds.  - Provide timely, complete, and accurate information to patient/family  - Incorporate patient and family knowledge, values, beliefs, and cultural backgrounds into the planning and delivery of care  - Encourage patient/family to participate in  care and decision-making at the level they choose  - Honor patient and family perspectives and choices  Outcome: Progressing     Problem: Patient/Family Goals  Goal: Patient/Family Long Term Goal  Description: Patient's Long Term Goal: Uncomplicated Delivery     Interventions:  - Assessment/Monitoring  - Induction/Augmentation per protocol and Provider order  - C/S per protocol and Provider order   - Education  - Intervention per protocol and Provider order with education   - Involve patient in POC  - See additional Care Plan goals for specific interventions     Outcome: Progressing  Goal: Patient/Family Short Term Goal  Description: Patient's Short Term Goal: Comfort and Pain Control     Interventions:   - Non Pharmacological pain intervention   - IV/IM and Epidural pain medication per Provider order and patient request  - Education  - Involve Patient in POC   - See additional Care Plan goals for specific interventions       Outcome: Progressing

## 2025-06-20 NOTE — PLAN OF CARE
Problem: BIRTH - VAGINAL/ SECTION  Goal: Fetal and maternal status remain reassuring during the birth process  Description: INTERVENTIONS:  - Monitor vital signs  - Monitor fetal heart rate  - Monitor uterine activity  - Monitor labor progression (vaginal delivery)  - DVT prophylaxis (C/S delivery)  - Surgical antibiotic prophylaxis (C/S delivery)  Outcome: Progressing     Problem: PAIN - ADULT  Goal: Verbalizes/displays adequate comfort level or patient's stated pain goal  Description: INTERVENTIONS:  - Encourage pt to monitor pain and request assistance  - Assess pain using appropriate pain scale  - Administer analgesics based on type and severity of pain and evaluate response  - Implement non-pharmacological measures as appropriate and evaluate response  - Consider cultural and social influences on pain and pain management  - Manage/alleviate anxiety  - Utilize distraction and/or relaxation techniques  - Monitor for opioid side effects  - Notify MD/LIP if interventions unsuccessful or patient reports new pain  - Anticipate increased pain with activity and pre-medicate as appropriate  Outcome: Progressing     Problem: ANXIETY  Goal: Will report anxiety at manageable levels  Description: INTERVENTIONS:  - Administer medication as ordered  - Teach and rehearse alternative coping skills  - Provide emotional support with 1:1 interaction with staff  Outcome: Progressing     Problem: Patient Centered Care  Goal: Patient preferences are identified and integrated in the patient's plan of care  Description: Interventions:  - What would you like us to know as we care for you? Patient is having a baby boy, plans to breast and bottle feed and would only like IV pains meds.  - Provide timely, complete, and accurate information to patient/family  - Incorporate patient and family knowledge, values, beliefs, and cultural backgrounds into the planning and delivery of care  - Encourage patient/family to participate in  care and decision-making at the level they choose  - Honor patient and family perspectives and choices  Outcome: Progressing    Problem: Patient/Family Goals  Patient's Short Term Goal: Comfort and Pain Control     Interventions:   - Non Pharmacological pain intervention   - IV/IM and Epidural pain medication per Provider order and patient request  - Education  - Involve Patient in POC   - See additional Care Plan goals for specific interventions  Patient's Long Term Goal: Uncomplicated Delivery     Interventions:  - Assessment/Monitoring  - Induction/Augmentation per protocol and Provider order  - C/S per protocol and Provider order   - Education  - Intervention per protocol and Provider order with education   - Involve patient in POC  - See additional Care Plan goals for specific interventions

## 2025-06-21 NOTE — ANESTHESIA POSTPROCEDURE EVALUATION
Patient: Anne-Marie Xavier    Procedure Summary       Date: 06/21/25 Room / Location:     Anesthesia Start: 0047 Anesthesia Stop: 0430    Procedure: LABOR ANALGESIA Diagnosis:     Scheduled Providers:  Anesthesiologist: Jillian Cardona MD    Anesthesia Type: epidural ASA Status: 2 - Emergent            Anesthesia Type: epidural    Vitals Value Taken Time   /91 06/21/25 05:30   Temp  06/21/25 06:40   Pulse 74 06/21/25 05:30   Resp  06/21/25 06:40   SpO2 100 % 06/21/25 04:45       [facility] AN Post Evaluation:   Patient Evaluated in PACU  Patient Participation: complete - patient participated  Level of Consciousness: awake  Pain Management: adequate  Airway Patency:patent  Dental exam unchanged from preop  Yes    Cardiovascular Status: acceptable  Respiratory Status: acceptable  Postoperative Hydration acceptable      JILLIAN CARDONA MD  6/21/2025 6:40 AM

## 2025-06-21 NOTE — ANESTHESIA PROCEDURE NOTES
Labor Analgesia    Date/Time: 6/21/2025 12:47 AM    Performed by: Jillian Cardona MD  Authorized by: Jillian Cardona MD      General Information and Staff    Start Time:  6/21/2025 12:47 AM  End Time:  6/21/2025 1:02 AM  Anesthesiologist:  Jillian Cardona MD  Performed by:  Anesthesiologist  Patient Location:  OB  Site Identification: surface landmarks    Reason for Block: labor epidural    Preanesthetic Checklist: patient identified, IV checked, site marked, risks and benefits discussed, monitors and equipment checked, pre-op evaluation, timeout performed, anesthesia consent and sterile technique used      Procedure Details    Patient Position:  Sitting  Prep: ChloraPrep    Monitoring:  Heart rate  Approach:  Midline    Epidural Needle    Injection Technique:  MELYSSA saline  Needle Type:  Tuohy  Needle Gauge:  18 G  Needle Length:  3.5 in  Needle Insertion Depth:  6  Location:  L2-3    Spinal Needle      Catheter    Catheter Type:  Multi-orifice  Catheter Size:  20 G  Catheter at Skin Depth:  12  Test Dose:  Negative    Assessment    Sensory Level:  T10    Additional Comments

## 2025-06-21 NOTE — PLAN OF CARE
Problem: BIRTH - VAGINAL/ SECTION  Goal: Fetal and maternal status remain reassuring during the birth process  Description: INTERVENTIONS:  - Monitor vital signs  - Monitor fetal heart rate  - Monitor uterine activity  - Monitor labor progression (vaginal delivery)  - DVT prophylaxis (C/S delivery)  - Surgical antibiotic prophylaxis (C/S delivery)  Outcome: Progressing     Problem: PAIN - ADULT  Goal: Verbalizes/displays adequate comfort level or patient's stated pain goal  Description: INTERVENTIONS:  - Encourage pt to monitor pain and request assistance  - Assess pain using appropriate pain scale  - Administer analgesics based on type and severity of pain and evaluate response  - Implement non-pharmacological measures as appropriate and evaluate response  - Consider cultural and social influences on pain and pain management  - Manage/alleviate anxiety  - Utilize distraction and/or relaxation techniques  - Monitor for opioid side effects  - Notify MD/LIP if interventions unsuccessful or patient reports new pain  - Anticipate increased pain with activity and pre-medicate as appropriate  Outcome: Progressing     Problem: ANXIETY  Goal: Will report anxiety at manageable levels  Description: INTERVENTIONS:  - Administer medication as ordered  - Teach and rehearse alternative coping skills  - Provide emotional support with 1:1 interaction with staff  Outcome: Progressing     Problem: Patient Centered Care  Goal: Patient preferences are identified and integrated in the patient's plan of care  Description: Interventions:  - What would you like us to know as we care for you? Patient is having a baby boy, plans to breast and bottle feed and would only like IV pains meds.  - Provide timely, complete, and accurate information to patient/family  - Incorporate patient and family knowledge, values, beliefs, and cultural backgrounds into the planning and delivery of care  - Encourage patient/family to participate in  care and decision-making at the level they choose  - Honor patient and family perspectives and choices  Outcome: Progressing     Problem: Patient/Family Goals  Goal: Patient/Family Long Term Goal  Description: Patient's Long Term Goal: Uncomplicated Delivery     Interventions:  - Assessment/Monitoring  - Induction/Augmentation per protocol and Provider order  - C/S per protocol and Provider order   - Education  - Intervention per protocol and Provider order with education   - Involve patient in POC  - See additional Care Plan goals for specific interventions     Outcome: Progressing  Goal: Patient/Family Short Term Goal  Description: Patient's Short Term Goal: Comfort and Pain Control     Interventions:   - Non Pharmacological pain intervention   - IV/IM and Epidural pain medication per Provider order and patient request  - Education  - Involve Patient in POC   - See additional Care Plan goals for specific interventions       Outcome: Progressing     Problem: POSTPARTUM  Goal: Long Term Goal:Experiences normal postpartum course  Description: INTERVENTIONS:  - Assess and monitor vital signs and lab values.  - Assess fundus and lochia.  - Provide ice/sitz baths for perineum discomfort.  - Monitor healing of incision/episiotomy/laceration, and assess for signs and symptoms of infection and hematoma.  - Assess bladder function and monitor for bladder distention.  - Provide/instruct/assist with pericare as needed.  - Provide VTE prophylaxis as needed.  - Monitor bowel function.  - Encourage ambulation and provide assistance as needed.  - Assess and monitor emotional status and provide social service/psych resources as needed.  - Utilize standard precautions and use personal protective equipment as indicated. Ensure aseptic care of all intravenous lines and invasive tubes/drains.  - Obtain immunization and exposure to communicable diseases history.  Outcome: Progressing  Goal: Optimize infant feeding at the  breast  Description: INTERVENTIONS:  - Initiate breast feeding within first hour after birth.   - Monitor effectiveness of current breast feeding efforts.  - Assess support systems available to mother/family.  - Identify cultural beliefs/practices regarding lactation, letdown techniques, maternal food preferences.  - Assess mother's knowledge and previous experience with breast feeding.  - Provide information as needed about early infant feeding cues (e.g., rooting, lip smacking, sucking fingers/hand) versus late cue of crying.  - Discuss/demonstrate breast feeding aids (e.g., infant sling, nursing footstool/pillows, and breast pumps).  - Encourage mother/other family members to express feelings/concerns, and actively listen.  - Educate father/SO about benefits of breast feeding and how to manage common lactation challenges.  - Recommend avoidance of specific medications or substances incompatible with breast feeding.  - Assess and monitor for signs of nipple pain/trauma.  - Instruct and provide assistance with proper latch.  - Review techniques for milk expression (breast pumping) and storage of breast milk. Provide pumping equipment/supplies, instructions and assistance, as needed.  - Encourage rooming-in and breast feeding on demand.  - Encourage skin-to-skin contact.  - Provide LC support as needed.  - Assess for and manage engorgement.  - Provide breast feeding education handouts and information on community breast feeding support.   Outcome: Progressing  Goal: Establishment of adequate milk supply with medication/procedure interruptions  Description: INTERVENTIONS:  - Review techniques for milk expression (breast pumping).   - Provide pumping equipment/supplies, instructions, and assistance until it is safe to breastfeed infant.  Outcome: Progressing  Goal: Experiences normal breast weaning course  Description: INTERVENTIONS:  - Assess for and manage engorgement.  - Instruct on breast care.  - Provide comfort  measures.  Outcome: Progressing  Goal: Appropriate maternal -  bonding  Description: INTERVENTIONS:  - Assess caregiver- interactions.  - Assess caregiver's emotional status and coping mechanisms.  - Encourage caregiver to participate in  daily care.  - Assess support systems available to mother/family.  - Provide /case management support as needed.  Outcome: Progressing

## 2025-06-21 NOTE — OPERATIVE REPORT
[facility]  part of [facility]    Vaginal Delivery Note    Anne-Marie Xavier Patient Status:  Inpatient    1985 MRN [id number]   Location [facility] Attending Pino Whitehead MD   Hosp Day # 1 PCP Nickie Alvarado MD     Delivery     Infant  Date of Delivery: 2025   Time of Delivery: 4:26 AM  Delivery Type: Normal spontaneous vaginal delivery    Infant Sex/Birthweight: male 9 lb 4.2 oz (4.2 kg)    Presentation Vertex [1]  Position Left [1] Occiput [1] Anterior [1]    Apgars:  1 minute:                 5 minutes:                          10 minutes:      Placenta  Date/Time of Delivery: 2025  4:30 AM   Delivery: spontaneous  Placenta to Pathology: no  Cord Gases Submitted: no  Cord Blood Collection: yes  Cord Tissue Collection: no  Cord Complications: none  Sponge and Needle Counts:  Verified yes    Maternal Anesthesia: epidural   Episiotomy/Laceration Repair  Repair Comments: no lacs    Delivery Complications  none  OBDeliveryHemorrhage: No, patient is stable, asymptomatic and blood loss is within expected amount following delivery. Standard treatment provided to prevent PPH. Patient does not meet ACOG criteria for hemorrhage at this time.  Neonatologist Present: no  Delivery Comment: glory    Intake/Output   EBL: Quantitative Blood Loss (mL) 100 ml    Pino Whitehead MD   2025  4:38 AM

## 2025-06-21 NOTE — ANESTHESIA PREPROCEDURE EVALUATION
Anesthesia PreOp Note    HPI:     Anne-Marie Xavier is a 39 year old female who presents for preoperative consultation requested by: * Surgery not found *    Date of Surgery:       Indication: * No surgery found *    Relevant Problems   No relevant active problems       NPO:                         History Review:  Patient Active Problem List    Diagnosis Date Noted    Pregnancy ([facility]) 01/30/2023    Vaginal delivery ([facility])     Low serum ferritin level 09/05/2022    Papanicolaou smear of cervix with high grade squamous intraepithelial lesion (HGSIL) 03/22/2021    Multigravida of advanced maternal age in third trimester ([facility]) 02/20/2021    Pterygium of both eyes 03/18/2019    Pap smear abnormality of cervix with LGSIL 10/02/2013       Past Medical History[1]    Past Surgical History[2]    Prescriptions Prior to Admission[3]  Current Medications and Prescriptions Ordered in Epic[4]    Allergies[5]    Family History[6]  Social Hx on file[7]    Available pre-op labs reviewed.  Lab Results   Component Value Date    WBC 7.5 06/20/2025    RBC 4.22 06/20/2025    HGB 13.4 06/20/2025    HCT 39.3 06/20/2025    MCV 93.1 06/20/2025    MCH 31.8 06/20/2025    MCHC 34.1 06/20/2025    RDW 12.8 06/20/2025    .0 06/20/2025             Vital Signs:  Body mass index is 32.61 kg/m².   height is 1.524 m (5') and weight is 75.8 kg (167 lb). Her oral temperature is 98.6 °F (37 °C). Her blood pressure is 98/72 and her pulse is 56. Her respiration is 16.   Vitals:    06/20/25 1800 06/20/25 1930 06/20/25 2132 06/20/25 2323   BP:  120/70 98/72    Pulse:   56    Resp:  20 16    Temp: 97.9 °F (36.6 °C) 97.8 °F (36.6 °C) 98.1 °F (36.7 °C) 98.6 °F (37 °C)   TempSrc: Oral Oral Oral Oral   Weight:  75.8 kg (167 lb)     Height:  1.524 m (5')          Anesthesia Evaluation      Airway   Mallampati: I  TM distance: >3 FB  Neck ROM: full  Dental      Pulmonary - normal exam   Cardiovascular - normal exam    Neuro/Psych      GI/Hepatic/Renal       Endo/Other    Abdominal   (+) obese                 Anesthesia Plan:   ASA:  2  Emergent    Plan:   Epidural  Informed Consent Plan and Risks Discussed With:  Patient      I have informed Anne-Marie Xavier and/or legal guardian or family member of the nature of the anesthetic plan, benefits, risks including possible dental damage if relevant, major complications, and any alternative forms of anesthetic management.   All of the patient's questions were answered to the best of my ability. The patient desires the anesthetic management as planned.  BLANQUITA PARKINSON MD  2025 1:01 AM  Present on Admission:  **None**           [1]   Past Medical History:   Distal radius fracture    Excessive cerumen in ear canal, right    History of abnormal cervical Pap smear    History of pregnancy     x3, missed AB    Migraine headache with aura    Migraine headache without aura   [2]   Past Surgical History:  Procedure Laterality Date    Hand/finger surgery unlisted Left        [3]   Medications Prior to Admission   Medication Sig Dispense Refill Last Dose/Taking    famotidine 20 MG Oral Tab Take 2 tablets (40 mg total) by mouth at bedtime. 60 tablet 5 Past Week    Prenatal Vit-DSS-Fe Fum-FA (SE- 19) 29-1 MG Oral Tab    Past Week    aspirin 81 MG Oral Tab EC Take 1 tablet (81 mg total) by mouth daily. 180 tablet 3 Past Week    Ferrous Sulfate 325 (65 Fe) MG Oral Tab Take 1 tablet (325 mg total) by mouth daily with breakfast. 30 tablet 3 Past Week   [4]   Current Facility-Administered Medications Ordered in Epic   Medication Dose Route Frequency Provider Last Rate Last Admin    acetaminophen (Tylenol Extra Strength) tab 500 mg  500 mg Oral Q6H PRN Angus Ramos MD        acetaminophen (Tylenol Extra Strength) tab 1,000 mg  1,000 mg Oral Q6H PRN Angus Ramos MD        ibuprofen (Motrin) tab 600 mg  600 mg Oral Once PRN Angus Ramos MD        ondansetron (Zofran) 4 MG/2ML injection 4 mg  4 mg  Intravenous Q6H PRN Angus Ramos MD        oxyTOCIN in sodium chloride 0.9% (Pitocin) 30 Units/500mL infusion premix  62.5-900 hannah-units/min Intravenous Continuous Angus Ramos MD        terbutaline (Brethine) 1 MG/ML injection 0.25 mg  0.25 mg Subcutaneous PRN Angus Ramos MD        sodium citrate-citric acid (Bicitra) 500-334 MG/5ML oral solution 30 mL  30 mL Oral PRN Angus Ramos MD        lidocaine PF (Xylocaine-MPF) 1% injection  30 mL Intradermal Once Angus Ramos MD        lactated ringers infusion   Intravenous Continuous Angus Ramos  mL/hr at 06/20/25 2134 New Bag at 06/20/25 2134    dextrose in lactated ringers 5% infusion   Intravenous PRN Angus Ramos MD        lactated ringers IV bolus 500 mL  500 mL Intravenous PRN Angus Ramos MD        nalbuphine (Nubain) 10 mg/mL injection 10 mg  10 mg Intramuscular Q6H PRN Angus Ramos MD        And    hydrOXYzine 50 mg/mL injection 50 mg  50 mg Intramuscular Q6H PRN Angus Ramos MD        fentaNYL (Sublimaze) 50 mcg/mL injection 50 mcg  50 mcg Intravenous Q30 Min PRN Angus Ramos MD   50 mcg at 06/20/25 2330    calcium carbonate (Tums) chewable tab 1,000 mg  1,000 mg Oral Q4H PRN Angus Ramos MD        oxyTOCIN in sodium chloride 0.9% (Pitocin) 30 Units/500mL infusion premix  0.5-20 hannah-units/min Intravenous Continuous Angus Ramos MD 10 mL/hr at 06/20/25 2323 10 hannah-units/min at 06/20/25 2323    fentaNYL-bupivacaine 2 mcg/mL-0.125% in sodium chloride 0.9% 100 mL EPIDURAL infusion premix   Epidural Continuous Jillian Cardona MD        fentaNYL (Sublimaze) 50 mcg/mL injection 100 mcg  100 mcg Epidural Once Jillian Cardona MD        bupivacaine PF (Marcaine) 0.25% injection  20 mL Epidural Once Jillian Cardona MD        ePHEDrine (PF) 25 MG/5 ML injection 5 mg  5 mg Intravenous PRN Jillian Cardona MD        nalbuphine (Nubain) 10 mg/mL  injection 2.5 mg  2.5 mg Intravenous Q15 Min PRN Jillian Cardona MD         No current Epic-ordered outpatient medications on file.   [5] No Known Allergies  [6]   Family History  Problem Relation Age of Onset    No Known Problems Father     No Known Problems Mother    [7]   Social History  Socioeconomic History    Marital status: Single   Tobacco Use    Smoking status: Never     Passive exposure: Never    Smokeless tobacco: Never   Vaping Use    Vaping status: Never Used   Substance and Sexual Activity    Alcohol use: No    Drug use: Never   Other Topics Concern    Caffeine Concern Yes     Comment: coffee 1 cup

## 2025-06-21 NOTE — PROGRESS NOTES
Patient up to bathroom with assist x 2.  Voided 500cc. Patient transferred to mother/baby room 353 per wheelchair in stable condition with baby and personal belongings.  Accompanied by daughter other and staff.  Current RN continuing care at this time. Report will be given to mother/baby RN upon shift change.    0715- Patient endorsed to Christine mother/baby RN.

## 2025-06-22 NOTE — PROGRESS NOTES
PROGRESS NOTE        Date:     2025    Patient: Anne-Marie Xavier       :    1985  Age:     39 year old      Gender: female  MRN:  [id number]    Subjective :  The patient is 39 year old y/o  Postpartum day #1 from a vaginal delivery.  She is doing well.  Lochia normal.  Pain controlled.  Breastfeeding without difficulty.       Objective   Physical Exam:  /67 (BP Location: Right arm)   Pulse 70   Temp 98.2 °F (36.8 °C)   Resp 15   Ht 5' (1.524 m)   Wt 167 lb   LMP 2024 (Exact Date)   SpO2 100%   Breastfeeding Yes   BMI 32.61 kg/m²     Intake/Output Summary (Last 24 hours) at 2025 0921  Last data filed at 2025 2100  Gross per 24 hour   Intake 850 ml   Output --   Net 850 ml     Abdomen - soft, ND, NT  Fundus -firm, NT  Lower Extremities - NT    Result Data:  Lab Results   Component Value Date    WBC 15.3 (H) 2025    RBC 3.71 (L) 2025    HGB 11.9 (L) 2025    HCT 36.0 2025    .0 2025    GLU 92 2020    BUN 7 2020     2020    K 3.7 2020     2020    CA 8.9 2020    CO2 22.0 2020     Abnormal Labs Reviewed   CBC WITH DIFFERENTIAL WITH PLATELET - Abnormal; Notable for the following components:       Result Value    WBC 15.3 (*)     RBC 3.71 (*)     HGB 11.9 (*)     Neutrophil Absolute Prelim 12.62 (*)     Neutrophil Absolute 12.62 (*)     All other components within normal limits             Assessment and Plan:    Active Problems:    Pregnancy (HCC)    Term birth of  male (HCC)    Elderly multigravida delivered (HCC)        PPD # 1  Cont PP care.  Ambulate.  Plan to discharge tomorrow      Rahel DICKEY     Patient seen and examined, Agree with assessment and plan of care documented by PA student.     Ayah Huynh MD

## 2025-06-22 NOTE — PLAN OF CARE
Problem: Patient Centered Care  Goal: Patient preferences are identified and integrated in the patient's plan of care  Description: Interventions:  - What would you like us to know as we care for you? Patient is having a baby boy, plans to breast and bottle feed and would only like IV pains meds.  - Provide timely, complete, and accurate information to patient/family  - Incorporate patient and family knowledge, values, beliefs, and cultural backgrounds into the planning and delivery of care  - Encourage patient/family to participate in care and decision-making at the level they choose  - Honor patient and family perspectives and choices  Outcome: Progressing     Problem: Patient/Family Goals  Goal: Patient/Family Long Term Goal  Description: Patient's Long Term Goal: Uncomplicated Delivery     Interventions:  - Assessment/Monitoring  - Induction/Augmentation per protocol and Provider order  - C/S per protocol and Provider order   - Education  - Intervention per protocol and Provider order with education   - Involve patient in POC  - See additional Care Plan goals for specific interventions     Outcome: Progressing  Goal: Patient/Family Short Term Goal  Description: Patient's Short Term Goal: Comfort and Pain Control     Interventions:   - Non Pharmacological pain intervention   - IV/IM and Epidural pain medication per Provider order and patient request  - Education  - Involve Patient in POC   - See additional Care Plan goals for specific interventions       Outcome: Progressing     Problem: POSTPARTUM  Goal: Long Term Goal:Experiences normal postpartum course  Description: INTERVENTIONS:  - Assess and monitor vital signs and lab values.  - Assess fundus and lochia.  - Provide ice/sitz baths for perineum discomfort.  - Monitor healing of incision/episiotomy/laceration, and assess for signs and symptoms of infection and hematoma.  - Assess bladder function and monitor for bladder distention.  -  Provide/instruct/assist with pericare as needed.  - Provide VTE prophylaxis as needed.  - Monitor bowel function.  - Encourage ambulation and provide assistance as needed.  - Assess and monitor emotional status and provide social service/psych resources as needed.  - Utilize standard precautions and use personal protective equipment as indicated. Ensure aseptic care of all intravenous lines and invasive tubes/drains.  - Obtain immunization and exposure to communicable diseases history.  Outcome: Progressing  Goal: Optimize infant feeding at the breast  Description: INTERVENTIONS:  - Initiate breast feeding within first hour after birth.   - Monitor effectiveness of current breast feeding efforts.  - Assess support systems available to mother/family.  - Identify cultural beliefs/practices regarding lactation, letdown techniques, maternal food preferences.  - Assess mother's knowledge and previous experience with breast feeding.  - Provide information as needed about early infant feeding cues (e.g., rooting, lip smacking, sucking fingers/hand) versus late cue of crying.  - Discuss/demonstrate breast feeding aids (e.g., infant sling, nursing footstool/pillows, and breast pumps).  - Encourage mother/other family members to express feelings/concerns, and actively listen.  - Educate father/SO about benefits of breast feeding and how to manage common lactation challenges.  - Recommend avoidance of specific medications or substances incompatible with breast feeding.  - Assess and monitor for signs of nipple pain/trauma.  - Instruct and provide assistance with proper latch.  - Review techniques for milk expression (breast pumping) and storage of breast milk. Provide pumping equipment/supplies, instructions and assistance, as needed.  - Encourage rooming-in and breast feeding on demand.  - Encourage skin-to-skin contact.  - Provide LC support as needed.  - Assess for and manage engorgement.  - Provide breast feeding education  handouts and information on community breast feeding support.   Outcome: Progressing  Goal: Establishment of adequate milk supply with medication/procedure interruptions  Description: INTERVENTIONS:  - Review techniques for milk expression (breast pumping).   - Provide pumping equipment/supplies, instructions, and assistance until it is safe to breastfeed infant.  Outcome: Progressing  Goal: Experiences normal breast weaning course  Description: INTERVENTIONS:  - Assess for and manage engorgement.  - Instruct on breast care.  - Provide comfort measures.  Outcome: Progressing  Goal: Appropriate maternal -  bonding  Description: INTERVENTIONS:  - Assess caregiver- interactions.  - Assess caregiver's emotional status and coping mechanisms.  - Encourage caregiver to participate in  daily care.  - Assess support systems available to mother/family.  - Provide /case management support as needed.  Outcome: Progressing

## 2025-06-22 NOTE — PLAN OF CARE
Problem: Patient Centered Care  Goal: Patient preferences are identified and integrated in the patient's plan of care  Description: Interventions:  - What would you like us to know as we care for you? Patient is having a baby boy, plans to breast and bottle feed and would only like IV pains meds.  - Provide timely, complete, and accurate information to patient/family  - Incorporate patient and family knowledge, values, beliefs, and cultural backgrounds into the planning and delivery of care  - Encourage patient/family to participate in care and decision-making at the level they choose  - Honor patient and family perspectives and choices  6/22/2025 1516 by Christine Hardwick RN  Outcome: Completed  6/22/2025 1005 by Christine Hardwick RN  Outcome: Progressing     Problem: Patient/Family Goals  Goal: Patient/Family Long Term Goal  Description: Patient's Long Term Goal: Uncomplicated Delivery     Interventions:  - Assessment/Monitoring  - Induction/Augmentation per protocol and Provider order  - C/S per protocol and Provider order   - Education  - Intervention per protocol and Provider order with education   - Involve patient in POC  - See additional Care Plan goals for specific interventions     6/22/2025 1516 by Christine Hardwick RN  Outcome: Completed  6/22/2025 1005 by Christine Hardwick RN  Outcome: Progressing  Goal: Patient/Family Short Term Goal  Description: Patient's Short Term Goal: Comfort and Pain Control     Interventions:   - Non Pharmacological pain intervention   - IV/IM and Epidural pain medication per Provider order and patient request  - Education  - Involve Patient in POC   - See additional Care Plan goals for specific interventions       6/22/2025 1516 by Christine Hardwick RN  Outcome: Completed  6/22/2025 1005 by Christine Hardwick RN  Outcome: Progressing     Problem: POSTPARTUM  Goal: Long Term Goal:Experiences normal postpartum course  Description: INTERVENTIONS:  - Assess and monitor vital signs and  lab values.  - Assess fundus and lochia.  - Provide ice/sitz baths for perineum discomfort.  - Monitor healing of incision/episiotomy/laceration, and assess for signs and symptoms of infection and hematoma.  - Assess bladder function and monitor for bladder distention.  - Provide/instruct/assist with pericare as needed.  - Provide VTE prophylaxis as needed.  - Monitor bowel function.  - Encourage ambulation and provide assistance as needed.  - Assess and monitor emotional status and provide social service/psych resources as needed.  - Utilize standard precautions and use personal protective equipment as indicated. Ensure aseptic care of all intravenous lines and invasive tubes/drains.  - Obtain immunization and exposure to communicable diseases history.  6/22/2025 1516 by Christine Hardwick, RN  Outcome: Completed  6/22/2025 1005 by Christine Hardwick, RN  Outcome: Progressing  Goal: Optimize infant feeding at the breast  Description: INTERVENTIONS:  - Initiate breast feeding within first hour after birth.   - Monitor effectiveness of current breast feeding efforts.  - Assess support systems available to mother/family.  - Identify cultural beliefs/practices regarding lactation, letdown techniques, maternal food preferences.  - Assess mother's knowledge and previous experience with breast feeding.  - Provide information as needed about early infant feeding cues (e.g., rooting, lip smacking, sucking fingers/hand) versus late cue of crying.  - Discuss/demonstrate breast feeding aids (e.g., infant sling, nursing footstool/pillows, and breast pumps).  - Encourage mother/other family members to express feelings/concerns, and actively listen.  - Educate father/SO about benefits of breast feeding and how to manage common lactation challenges.  - Recommend avoidance of specific medications or substances incompatible with breast feeding.  - Assess and monitor for signs of nipple pain/trauma.  - Instruct and provide assistance with  proper latch.  - Review techniques for milk expression (breast pumping) and storage of breast milk. Provide pumping equipment/supplies, instructions and assistance, as needed.  - Encourage rooming-in and breast feeding on demand.  - Encourage skin-to-skin contact.  - Provide LC support as needed.  - Assess for and manage engorgement.  - Provide breast feeding education handouts and information on community breast feeding support.   2025 by Christine Hardwick RN  Outcome: Completed  2025 by Christine Hardwick RN  Outcome: Progressing  Goal: Establishment of adequate milk supply with medication/procedure interruptions  Description: INTERVENTIONS:  - Review techniques for milk expression (breast pumping).   - Provide pumping equipment/supplies, instructions, and assistance until it is safe to breastfeed infant.  2025 by Christine Hardwick RN  Outcome: Completed  2025 by Christine Hardwick RN  Outcome: Progressing  Goal: Experiences normal breast weaning course  Description: INTERVENTIONS:  - Assess for and manage engorgement.  - Instruct on breast care.  - Provide comfort measures.  2025 by Christine Hardwick RN  Outcome: Completed  2025 by Christine Hardwick RN  Outcome: Progressing  Goal: Appropriate maternal -  bonding  Description: INTERVENTIONS:  - Assess caregiver- interactions.  - Assess caregiver's emotional status and coping mechanisms.  - Encourage caregiver to participate in  daily care.  - Assess support systems available to mother/family.  - Provide /case management support as needed.  2025 by Christine Hardwick RN  Outcome: Completed  2025 by Christine Hardwick RN  Outcome: Progressing

## 2025-06-22 NOTE — PLAN OF CARE
Problem: BIRTH - VAGINAL/ SECTION  Goal: Fetal and maternal status remain reassuring during the birth process  Description: INTERVENTIONS:  - Monitor vital signs  - Monitor fetal heart rate  - Monitor uterine activity  - Monitor labor progression (vaginal delivery)  - DVT prophylaxis (C/S delivery)  - Surgical antibiotic prophylaxis (C/S delivery)  Outcome: Completed     Problem: PAIN - ADULT  Goal: Verbalizes/displays adequate comfort level or patient's stated pain goal  Description: INTERVENTIONS:  - Encourage pt to monitor pain and request assistance  - Assess pain using appropriate pain scale  - Administer analgesics based on type and severity of pain and evaluate response  - Implement non-pharmacological measures as appropriate and evaluate response  - Consider cultural and social influences on pain and pain management  - Manage/alleviate anxiety  - Utilize distraction and/or relaxation techniques  - Monitor for opioid side effects  - Notify MD/LIP if interventions unsuccessful or patient reports new pain  - Anticipate increased pain with activity and pre-medicate as appropriate  Outcome: Completed     Problem: ANXIETY  Goal: Will report anxiety at manageable levels  Description: INTERVENTIONS:  - Administer medication as ordered  - Teach and rehearse alternative coping skills  - Provide emotional support with 1:1 interaction with staff  Outcome: Completed     Problem: Patient Centered Care  Goal: Patient preferences are identified and integrated in the patient's plan of care  Description: Interventions:  - What would you like us to know as we care for you? Patient is having a baby boy, plans to breast and bottle feed and would only like IV pains meds.  - Provide timely, complete, and accurate information to patient/family  - Incorporate patient and family knowledge, values, beliefs, and cultural backgrounds into the planning and delivery of care  - Encourage patient/family to participate in care and  decision-making at the level they choose  - Honor patient and family perspectives and choices  Outcome: Progressing     Problem: POSTPARTUM  Goal: Long Term Goal:Experiences normal postpartum course  Description: INTERVENTIONS:  - Assess and monitor vital signs and lab values.  - Assess fundus and lochia.  - Provide ice/sitz baths for perineum discomfort.  - Monitor healing of incision/episiotomy/laceration, and assess for signs and symptoms of infection and hematoma.  - Assess bladder function and monitor for bladder distention.  - Provide/instruct/assist with pericare as needed.  - Provide VTE prophylaxis as needed.  - Monitor bowel function.  - Encourage ambulation and provide assistance as needed.  - Assess and monitor emotional status and provide social service/psych resources as needed.  - Utilize standard precautions and use personal protective equipment as indicated. Ensure aseptic care of all intravenous lines and invasive tubes/drains.  - Obtain immunization and exposure to communicable diseases history.  Outcome: Progressing  Goal: Optimize infant feeding at the breast  Description: INTERVENTIONS:  - Initiate breast feeding within first hour after birth.   - Monitor effectiveness of current breast feeding efforts.  - Assess support systems available to mother/family.  - Identify cultural beliefs/practices regarding lactation, letdown techniques, maternal food preferences.  - Assess mother's knowledge and previous experience with breast feeding.  - Provide information as needed about early infant feeding cues (e.g., rooting, lip smacking, sucking fingers/hand) versus late cue of crying.  - Discuss/demonstrate breast feeding aids (e.g., infant sling, nursing footstool/pillows, and breast pumps).  - Encourage mother/other family members to express feelings/concerns, and actively listen.  - Educate father/SO about benefits of breast feeding and how to manage common lactation challenges.  - Recommend avoidance  of specific medications or substances incompatible with breast feeding.  - Assess and monitor for signs of nipple pain/trauma.  - Instruct and provide assistance with proper latch.  - Review techniques for milk expression (breast pumping) and storage of breast milk. Provide pumping equipment/supplies, instructions and assistance, as needed.  - Encourage rooming-in and breast feeding on demand.  - Encourage skin-to-skin contact.  - Provide LC support as needed.  - Assess for and manage engorgement.  - Provide breast feeding education handouts and information on community breast feeding support.   Outcome: Progressing  Goal: Establishment of adequate milk supply with medication/procedure interruptions  Description: INTERVENTIONS:  - Review techniques for milk expression (breast pumping).   - Provide pumping equipment/supplies, instructions, and assistance until it is safe to breastfeed infant.  Outcome: Progressing  Goal: Experiences normal breast weaning course  Description: INTERVENTIONS:  - Assess for and manage engorgement.  - Instruct on breast care.  - Provide comfort measures.  Outcome: Progressing  Goal: Appropriate maternal -  bonding  Description: INTERVENTIONS:  - Assess caregiver- interactions.  - Assess caregiver's emotional status and coping mechanisms.  - Encourage caregiver to participate in  daily care.  - Assess support systems available to mother/family.  - Provide /case management support as needed.  Outcome: Progressing

## 2025-06-22 NOTE — DISCHARGE INSTRUCTIONS
-Pelvic rest for 6 weeks; no sex, tampons, douching, baths, or pools until after 6 weeks check-up.  -No heavy lifting; increase activity gradually.  -No driving if taking narcotics.    CALL YOUR PROVIDER IF:  Increased/heavy bleeding (I.e. Changing a saturated pad every hour).  New onset chills/fever greater than 100.4.  Pain in your vagina or abdomen that gets worse and isn't relieved with medicine.  Swelling or discharge with a bad odor from vagina.  Burning, pain, red streaks, or lumpy areas in your breasts that may be accompanied by flu-like symptoms.  Painful urination, or inability to control urination.  Nausea, vomiting, dizziness, or fainting.  Feelings of extreme sadness or anxiety, or a feeling that you don’t want to be with your baby.  Redness, warmth, or pain in the lower leg.  Chest pain or shortness of breath.  If : Check incision site AT LEAST 2x daily for signs and symptoms of infection (increased redness, warmth, tenderness, or drainage)    Mom & Baby Hour: Meets in person every WEDNESDAY at 10am at the [facility] in [place] ([address]) in the community education room. The group is for new moms and their babies up to 6 months of age. It includes breastfeeding supports with a certified lactation educator to be available to answer your breastfeeding questions.

## 2025-06-22 NOTE — DISCHARGE SUMMARY
[facility]  part of [facility]    OB/GYNE Postpartum Discharge Summary      Anne-Marie Xavier Patient Status:  Inpatient    1985 MRN [id number]   Location [facility] 3SE Attending Pino Whitehead MD   Hosp Day # 2 PCP Nickie Alvarado MD              Date:     2025    Patient:  Anne-Marie Xavier       :     1985  Age:      39 year old      Gender:  female  MRN:   [id number]  Admit Date:  2025   Discharge Date:  2025     Subjective :  The patient 39 year old y/o  is Postpartum day #1 from a vaginal delivery.  She is doing well.  Lochia normal.  Pain controlled.  Breastfeeding without difficulty.       Objective   Physical Exam:  /58 (BP Location: Right arm)   Pulse 72   Temp 97.8 °F (36.6 °C) (Oral)   Resp 16   Ht 5' (1.524 m)   Wt 167 lb (75.8 kg)   LMP 2024 (Exact Date)   SpO2 100%   Breastfeeding Yes   BMI 32.61 kg/m²     Intake/Output Summary (Last 24 hours) at 2025 1234  Last data filed at 2025 2100  Gross per 24 hour   Intake 850 ml   Output --   Net 850 ml     Abdomen - soft, ND, NT  Fundus -firm, NT  Lower Extremities - NT    Result Data:  Lab Results   Component Value Date    WBC 15.3 (H) 2025    RBC 3.71 (L) 2025    HGB 11.9 (L) 2025    HCT 36.0 2025    .0 2025    GLU 92 2020    BUN 7 2020     2020    K 3.7 2020     2020    CA 8.9 2020    CO2 22.0 2020     Abnormal Labs Reviewed   CBC WITH DIFFERENTIAL WITH PLATELET - Abnormal; Notable for the following components:       Result Value    WBC 15.3 (*)     RBC 3.71 (*)     HGB 11.9 (*)     Neutrophil Absolute Prelim 12.62 (*)     Neutrophil Absolute 12.62 (*)     All other components within normal limits                Assessment and Plan:    Active Problems:    Pregnancy (HCC)    Term birth of  male (HCC)    Elderly multigravida delivered (HCC)        PPD # 1  Cont PP  care.  Ambulate.  Discharge home - f/u 2-3 wks for PP visit  Discharge Condition - Stable   Call office if heavy bleeding soaking a pad in <1hr, fever, problems breastfeeding or any depression issues.      Ayah Huynh MD

## 2025-07-12 NOTE — PROGRESS NOTES
Reviewed self and infant care with mom in [place], she verbalizes understanding of instruction reviewed. Encouraged to follow up with MD's as directed with questions/concerns.

## 2025-07-15 NOTE — PROGRESS NOTES
HPI    Anne-Marie Xavier is a 39 year old female  here for 3 week post-partum visit.  Patient delivered a  female infant on 25 via .  C/b adv maternal age    Pt states she is voiding freely, tolerating general diet, having normal bowel movements and minimal locia.  Pt has not been sexual active.  Patient ocps for contraception.    Patient is breast & bottle feeding.   Pt denies any significant breast tenderness/engorgement.  Patient denies symptoms of post partum depression, [place]  depression scale score - see notes.    And today she c/o rectal lumps or possible warts. Noted them 3yrs ago. Denies constipation      OBSTETRIC HISTORY  OB History    Para Term  AB Living   6 5 5  1 5   SAB IAB Ectopic Multiple Live Births   1   0 5      # Outcome Date GA Lbr Jai/2nd Weight Sex Type Anes PTL Lv   6 Term 25 40w4d 22:04 / 00:26 9 lb 4.2 oz (4.2 kg) M NORMAL SPONT EPI N EDEN      Complications: Variable decelerations   5 Term 23 40w5d 13:50 / 00:12 7 lb 11.8 oz (3.51 kg) F NORMAL SPONT None N EDEN      Complications: Variable decelerations   4 Term 21 39w5d / 16:46 8 lb 7.8 oz (3.85 kg) F NORMAL SPONT Local N EDEN   3 Term 14 39w0d  8 lb 9 oz (3.884 kg) M Vag-Spont None N EDEN   2 SAB  9w0d          1 Term 03 39w0d  7 lb 9 oz (3.43 kg) F Vag-Spont None N EDEN       GYNE HISTORY        MEDICATIONS:  Medications - Current[1]    ALLERGIES:  Allergies[2]    PHYSICAL EXAM  Blood pressure 116/78, pulse 78, weight 146 lb (66.2 kg), last menstrual period 2024, currently breastfeeding.  General:  Well nourished, well developed woman in no acute distress  Rectal exam: external hemorrhoids, no warts        ASSESSMENT/PLAN  39 year old female  here for post-partum visit  Pap UTD  Pelvic floor physical therapy discussed, will let us know    Hemorrhoids, follow up with pcp    Discussed return to fertility and menses.  Patient counseled on contraceptive  options.  Patient has chosen mini pill.  Patient to return for annual .       [1]   Current Outpatient Medications:     Norethindrone 0.35 MG Oral Tab, Take 1 tablet (0.35 mg total) by mouth daily., Disp: 360 tablet, Rfl: 0    Prenatal Vit-DSS-Fe Fum-FA (SE- 19) 29-1 MG Oral Tab, , Disp: , Rfl:     Ferrous Sulfate 325 (65 Fe) MG Oral Tab, Take 1 tablet (325 mg total) by mouth daily with breakfast. (Patient not taking: Reported on 7/15/2025), Disp: 30 tablet, Rfl: 3  [2] No Known Allergies

## (undated) NOTE — LETTER
4/22/2025              Anne-Marie Xavier        [address]        [facility] [id number]    To whom may concern:         Anne-Marie Xavier is currently under our care for pregnancy. It is permissible at her gestational age for dental work to be performed. However, if x-rays are needed, please make sure that the abdomen and thyroid gland are shielded appropriately, and thoroughly.     Analgesia is also permissible as long as there are no vasoconstrictors used. For post treatment pain relief, Tylenol or Tylenol #3 is acceptable. If an antibiotic is needed, a penicillin derivative may be used.     If you have any additional concerns, do not hesitate to contact our office at [phone].       Sincerely,    DEVYN RUELAS PA-C          Document electronically generated by:  Jacqueline MCDONALD CMA

## (undated) NOTE — LETTER
8/1/2022              Virgiliotrista Brunnering Hipolito        [address]        [facility] [id number]         To Whom It May Concern,    The above named patient is currently under our care for pregnancy. It is permissible at her gestational age for dental work to be performed. However, if x-rays are needed, please make sure that the abdomen and thyroid gland are shielded appropriately, and thoroughly. Analgesia is also permissible as long as there are no vasoconstrictors used. For post treatment pain relief, Tylenol or Tylenol #3 is acceptable. If an antibiotic is needed, a penicillin derivative may be used. If you have any additional concerns, do not hesitate to contact our office at [phone].       Sincerely,    Connor Schrader MD  [address] [facility]  [place]  [phone]

## (undated) NOTE — LETTER
8/1/2022              Anne-Marie Mcmillan        [address]        [place] [id number]         To whom may concern,    José Morris is under my prenatal care with an Estimated due date of January 25,2023. If you have any further questions, feel free to contact the office at [phone].         Sincerely,    MD Sylwia Barrios [place] , [address], [place] [address] [phone]

## (undated) NOTE — LETTER
10/10/2020              Anne-Marie Xavier        [address]        [facility] [id number]         To whom this may concern,    Leyla Martinez was seen in my office today and had a confirmed positive pregnancy test (please see attached).       Sincerely,

## (undated) NOTE — LETTER
VACCINE ADMINISTRATION RECORD  PARENT / GUARDIAN APPROVAL  Date: 3/25/2025  Vaccine administered to: Anne-Marie Xavier     : 1985    MRN: [id number]    A copy of the appropriate Centers for Disease Control and Prevention Vaccine Information statement has been provided. I have read or have had explained the information about the diseases and the vaccines listed below. There was an opportunity to ask questions and any questions were answered satisfactorily. I believe that I understand the benefits and risks of the vaccine cited and ask that the vaccine(s) listed below be given to me or to the person named above (for whom I am authorized to make this request).    VACCINES ADMINISTERED:  Tdap    I have read and hereby agree to be bound by the terms of this agreement as stated above. My signature is valid until revoked by me in writing.  This document is signed by patient , relationship: Self on 3/25/2025.:                                                                                                                                         Parent / Guardian Signature                                                Date    AMISHA Shi served as a witness to authentication that the identity of the person signing electronically is in fact the person represented as signing.    This document was generated by AMISHA Shi on 3/25/2025.

## (undated) NOTE — LETTER
[facility]   Administracion de Anestesia  Yo, Anne-Marie Xavier, acepto ser atendido por un anestesiólogo, quien está especialmente capacitado para monitorearme y darme medicamento para hacerme dormir o mantenerme cómodo guillermina mi procedimiento.   Entiendo que mi anestesiólogo no es un empleado o agente de [facility]. Él o roberto trabaja para [facility]  [place] el paciente que solicita los servicios de anestesia, estoy de acuerdo con lo siguiente:  Permitir al anestesiólogo (médico de anestesia) que me suministre el medicamento y hacer los procedimientos adicionales que lyric necesarios. Algunos ejemplos son: Al iniciar o utilizar zurdo “IV” para suministrarme medicamentos, fluidos o aniya guillermina mi procedimiento, y colocarme zurdo sonda de respiración, me ayudará a respirar cuando esté dormido (intubación). En el susie de que mi corazón deje de funcionar correctamente, entiendo que mi anestesiólogo hará todo lo posible para salvar mi elza, a menos que los documentos de No resucitar de [facility] lo indiquen de otra manera.  Informar a mi anestesista antes del procedimiento:  Si estoy embarazada.  La última vez que comí o bebí algo.  Todos los medicamentos que koki (incluyendo medicamentos recetados, suplementos herbales y pastillas que puedo comprar sin receta médica (incluyendo drogas en la patten [medicamentos ilegales]). No informar a mi anestesiólogo acerca de estos medicamentos puede aumentar mi riesgo de complicaciones con la anestesia.  Si soy alérgico a cualquier cosa o he tenido previamente zurdo reacción adversa a la anestesia.  Entiendo cómo la anestesia me ayudará (beneficios).  Entiendo que con cualquier tipo de anestesia hay riesgos:  Los riesgos más comunes son: náuseas, vómitos, dolor de garganta, dolor muscular, daño a los ojos, la boca o los dientes (por la colocación de la sonda de respiración).  Los riesgos poco comunes incluyen: recordar  lo que sucedió guillermina mi procedimiento, reacciones alérgicas a los medicamentos, lesiones en las vías respiratorias, el corazón, los pulmones, la visión, los nervios o músculos, y en casos sumamente raros, la muerte.  Mii médico me ha explicado otras opciones de atención disponibles para mí (alternativas).  Pacientes embarazadas (“epidural”):    Entiendo que los riesgos de recibir zurdo inyección epidural (medicamento que se aplica en la espalda para ayudar a controlar el dolor guillermina el parto), incluyen picazón, presión arterial baja, dificultad para orinar, dolor de harriet o disminución en el ritmo del corazón del bebé. Los riesgos muy poco comunes incluyen infección, hemorragia, convulsiones, ritmo cardíaco irregular y lesión del nervio.  Anestesia regional (“columna vertebral”, “epidural” y “bloqueo de los nervios”):  Entiendo que hay complicaciones poco frecuentes imelda posibles, e incluyen dolor de harriet, sangrado, infección, convulsiones, ritmo cardíaco irregular y la lesión del nervio.  .   Puedo cambiar de opinión acerca de recibir servicios de anestesia en cualquier momento antes de que se me administre el medicamento.         Paciente (o representante) Firma/Relación con el paciente  Fecha  Hora  Patient Signature/Signature of Responsible person Date Time           Nombre del intérprete (en smith susie)  Idioma/Organización  Hora  Name of  Language/Organization Time          Firma del anestesiólogo Fecha  Hora  Signature of anesthesiologist Date Time    He hablado del procedimiento y la información anterior con el paciente (o el representante del paciente) y respondí matthew preguntas. El paciente o smith representante ha aceptado recibir los servicios de anestesia.         Testigo Fecha  Hora  Witness Date Time  He verificado que la firma es la del paciente o del representante del paciente, y que se firmó antes del procedimiento.    Nombre del paciente: Anne-Marie Xavier Fec. de Nac.: 12/31/1985                  En letra de imprenta: June 20, 2025  Expediente médico No. [id number]                         Página 1 de 2  ----------ANESTHESIA CONSENT----------

## (undated) NOTE — LETTER
Dear new mom:    We've missed you! The nurses of [facility] have tried to reach you by phone to ask if you had any questions regarding your health or the care of your new little one.     Please feel free to call your doctor with

## (undated) NOTE — LETTER
1/6/2025              Anne-Marie Xavier        [address]        [facility] [address]        To whom may concern:    Anne-Maire Muir is under my prenatal care for pregnancy with an estimated date delivery date of: 07/02/2025. If you need any additional information please feel free to call the office [phone].      Sincerely,    DEVYN RUELAS PA-C          Document electronically generated by:  Jacqueline MCDONALD CMA

## (undated) NOTE — LETTER
[facility]  Administration of Anesthesia  1.  Rene Hendrickson, or _________________________________ acting on her behalf, (Patient) (Dependent/Representative) request to receive anesthesia for my pending procedure/operation/treat spinal bleeding, seizure, cardiac arrest and death. 7. AWARENESS: I understand that it is possible (but unlikely) to have explicit memory of events from the operating room while under general anesthesia.   8. ELECTROCONVULSIVE THERAPY PATIENTS: This consen signature below affirms that prior to the time of the procedure, I have explained to the patient and/or his/her guardian, the risks and benefits of undergoing anesthesia, as well as any reasonable alternatives.     __________________________________________

## (undated) NOTE — LETTER
2020                Re: Makayla Mendez  : 1985    To Whom It May Concern:  Makayla Mendez is under my care for pregnancy with an Estimated Date of Delivery: 6/3/21.   If you have any questions concerning this letter, please feel free